# Patient Record
Sex: MALE | Race: WHITE | NOT HISPANIC OR LATINO | Employment: OTHER | URBAN - NONMETROPOLITAN AREA
[De-identification: names, ages, dates, MRNs, and addresses within clinical notes are randomized per-mention and may not be internally consistent; named-entity substitution may affect disease eponyms.]

---

## 2018-06-19 ENCOUNTER — OFFICE VISIT CONVERTED (OUTPATIENT)
Dept: FAMILY MEDICINE CLINIC | Age: 83
End: 2018-06-19
Attending: FAMILY MEDICINE

## 2018-10-19 ENCOUNTER — OFFICE VISIT CONVERTED (OUTPATIENT)
Dept: FAMILY MEDICINE CLINIC | Age: 83
End: 2018-10-19
Attending: FAMILY MEDICINE

## 2019-04-19 ENCOUNTER — HOSPITAL ENCOUNTER (OUTPATIENT)
Dept: OTHER | Facility: HOSPITAL | Age: 84
Discharge: HOME | End: 2019-04-19
Attending: FAMILY MEDICINE

## 2019-04-19 ENCOUNTER — OFFICE VISIT CONVERTED (OUTPATIENT)
Dept: FAMILY MEDICINE CLINIC | Age: 84
End: 2019-04-19
Attending: FAMILY MEDICINE

## 2019-04-19 LAB
ALBUMIN SERPL-MCNC: 4.2 G/DL (ref 3.5–5)
ALBUMIN/GLOB SERPL: 1.5 {RATIO} (ref 1.4–2.6)
ALP SERPL-CCNC: 67 U/L (ref 56–155)
ALT SERPL-CCNC: 17 U/L (ref 10–40)
ANION GAP SERPL CALC-SCNC: 16 MMOL/L (ref 8–19)
AST SERPL-CCNC: 23 U/L (ref 15–50)
BILIRUB SERPL-MCNC: 1.15 MG/DL (ref 0.2–1.3)
BUN SERPL-MCNC: 13 MG/DL (ref 5–25)
BUN/CREAT SERPL: 15 {RATIO} (ref 6–20)
CALCIUM SERPL-MCNC: 9.3 MG/DL (ref 8.7–10.4)
CHLORIDE SERPL-SCNC: 99 MMOL/L (ref 99–111)
CHOLEST SERPL-MCNC: 152 MG/DL (ref 107–200)
CHOLEST/HDLC SERPL: 1.7 {RATIO} (ref 3–6)
CONV CO2: 26 MMOL/L (ref 22–32)
CONV TOTAL PROTEIN: 7 G/DL (ref 6.3–8.2)
CREAT UR-MCNC: 0.84 MG/DL (ref 0.7–1.2)
EST. AVERAGE GLUCOSE BLD GHB EST-MCNC: 105 MG/DL
GFR SERPLBLD BASED ON 1.73 SQ M-ARVRAT: >60 ML/MIN/{1.73_M2}
GLOBULIN UR ELPH-MCNC: 2.8 G/DL (ref 2–3.5)
GLUCOSE SERPL-MCNC: 134 MG/DL (ref 70–99)
HBA1C MFR BLD: 5.3 % (ref 3.5–5.7)
HDLC SERPL-MCNC: 90 MG/DL (ref 40–60)
LDLC SERPL CALC-MCNC: 49 MG/DL (ref 70–100)
OSMOLALITY SERPL CALC.SUM OF ELEC: 284 MOSM/KG (ref 273–304)
POTASSIUM SERPL-SCNC: 4.5 MMOL/L (ref 3.5–5.3)
SODIUM SERPL-SCNC: 136 MMOL/L (ref 135–147)
TRIGL SERPL-MCNC: 65 MG/DL (ref 40–150)
VLDLC SERPL-MCNC: 13 MG/DL (ref 5–37)

## 2019-08-19 ENCOUNTER — OFFICE VISIT CONVERTED (OUTPATIENT)
Dept: FAMILY MEDICINE CLINIC | Age: 84
End: 2019-08-19
Attending: FAMILY MEDICINE

## 2019-08-19 ENCOUNTER — HOSPITAL ENCOUNTER (OUTPATIENT)
Dept: OTHER | Facility: HOSPITAL | Age: 84
Discharge: HOME | End: 2019-08-19
Attending: FAMILY MEDICINE

## 2019-08-19 LAB
ALBUMIN SERPL-MCNC: 4.5 G/DL (ref 3.5–5)
ALBUMIN/GLOB SERPL: 1.7 {RATIO} (ref 1.4–2.6)
ALP SERPL-CCNC: 62 U/L (ref 56–155)
ALT SERPL-CCNC: 27 U/L (ref 10–40)
ANION GAP SERPL CALC-SCNC: 18 MMOL/L (ref 8–19)
AST SERPL-CCNC: 31 U/L (ref 15–50)
BILIRUB SERPL-MCNC: 0.96 MG/DL (ref 0.2–1.3)
BUN SERPL-MCNC: 31 MG/DL (ref 5–25)
BUN/CREAT SERPL: 36 {RATIO} (ref 6–20)
CALCIUM SERPL-MCNC: 9.6 MG/DL (ref 8.7–10.4)
CHLORIDE SERPL-SCNC: 106 MMOL/L (ref 99–111)
CHOLEST SERPL-MCNC: 137 MG/DL (ref 107–200)
CHOLEST/HDLC SERPL: 1.9 {RATIO} (ref 3–6)
CONV CO2: 21 MMOL/L (ref 22–32)
CONV TOTAL PROTEIN: 7.2 G/DL (ref 6.3–8.2)
CREAT UR-MCNC: 0.86 MG/DL (ref 0.7–1.2)
ERYTHROCYTE [DISTWIDTH] IN BLOOD BY AUTOMATED COUNT: 13.4 % (ref 11.5–14.5)
EST. AVERAGE GLUCOSE BLD GHB EST-MCNC: 108 MG/DL
GFR SERPLBLD BASED ON 1.73 SQ M-ARVRAT: >60 ML/MIN/{1.73_M2}
GLOBULIN UR ELPH-MCNC: 2.7 G/DL (ref 2–3.5)
GLUCOSE SERPL-MCNC: 120 MG/DL (ref 70–99)
HBA1C MFR BLD: 15.2 G/DL (ref 14–18)
HBA1C MFR BLD: 5.4 % (ref 3.5–5.7)
HCT VFR BLD AUTO: 45.1 % (ref 42–52)
HDLC SERPL-MCNC: 71 MG/DL (ref 40–60)
LDLC SERPL CALC-MCNC: 56 MG/DL (ref 70–100)
MCH RBC QN AUTO: 32.8 PG (ref 27–31)
MCHC RBC AUTO-ENTMCNC: 33.7 G/DL (ref 33–37)
MCV RBC AUTO: 97.2 FL (ref 80–96)
OSMOLALITY SERPL CALC.SUM OF ELEC: 298 MOSM/KG (ref 273–304)
PLATELET # BLD AUTO: 230 10*3/UL (ref 130–400)
PMV BLD AUTO: 9.4 FL (ref 7.4–10.4)
POTASSIUM SERPL-SCNC: 4.9 MMOL/L (ref 3.5–5.3)
RBC # BLD AUTO: 4.64 10*6/UL (ref 4.7–6.1)
SODIUM SERPL-SCNC: 140 MMOL/L (ref 135–147)
TRIGL SERPL-MCNC: 51 MG/DL (ref 40–150)
TSH SERPL-ACNC: 1.95 M[IU]/L (ref 0.27–4.2)
VLDLC SERPL-MCNC: 10 MG/DL (ref 5–37)
WBC # BLD AUTO: 8.91 10*3/UL (ref 4.8–10.8)

## 2019-08-30 ENCOUNTER — CONVERSION ENCOUNTER (OUTPATIENT)
Dept: CARDIOLOGY | Facility: CLINIC | Age: 84
End: 2019-08-30
Attending: SPECIALIST

## 2019-10-21 ENCOUNTER — OFFICE VISIT CONVERTED (OUTPATIENT)
Dept: FAMILY MEDICINE CLINIC | Age: 84
End: 2019-10-21
Attending: FAMILY MEDICINE

## 2019-10-21 ENCOUNTER — HOSPITAL ENCOUNTER (OUTPATIENT)
Dept: OTHER | Facility: HOSPITAL | Age: 84
Discharge: HOME | End: 2019-10-21
Attending: FAMILY MEDICINE

## 2019-10-21 LAB
ALBUMIN SERPL-MCNC: 4.5 G/DL (ref 3.5–5)
ALBUMIN/GLOB SERPL: 1.5 {RATIO} (ref 1.4–2.6)
ALP SERPL-CCNC: 76 U/L (ref 56–155)
ALT SERPL-CCNC: 28 U/L (ref 10–40)
ANION GAP SERPL CALC-SCNC: 20 MMOL/L (ref 8–19)
AST SERPL-CCNC: 29 U/L (ref 15–50)
BASOPHILS # BLD MANUAL: 0.07 10*3/UL (ref 0–0.2)
BASOPHILS NFR BLD MANUAL: 0.9 % (ref 0–3)
BILIRUB SERPL-MCNC: 1.17 MG/DL (ref 0.2–1.3)
BUN SERPL-MCNC: 12 MG/DL (ref 5–25)
BUN/CREAT SERPL: 14 {RATIO} (ref 6–20)
CALCIUM SERPL-MCNC: 10.1 MG/DL (ref 8.7–10.4)
CHLORIDE SERPL-SCNC: 103 MMOL/L (ref 99–111)
CHOLEST SERPL-MCNC: 155 MG/DL (ref 107–200)
CHOLEST/HDLC SERPL: 2.2 {RATIO} (ref 3–6)
CONV CO2: 22 MMOL/L (ref 22–32)
CONV TOTAL PROTEIN: 7.5 G/DL (ref 6.3–8.2)
CREAT UR-MCNC: 0.86 MG/DL (ref 0.7–1.2)
DEPRECATED RDW RBC AUTO: 46.1 FL
EOSINOPHIL # BLD MANUAL: 0.41 10*3/UL (ref 0–0.7)
EOSINOPHIL NFR BLD MANUAL: 5.2 % (ref 0–7)
ERYTHROCYTE [DISTWIDTH] IN BLOOD BY AUTOMATED COUNT: 12.7 % (ref 11.5–14.5)
EST. AVERAGE GLUCOSE BLD GHB EST-MCNC: 111 MG/DL
GFR SERPLBLD BASED ON 1.73 SQ M-ARVRAT: >60 ML/MIN/{1.73_M2}
GLOBULIN UR ELPH-MCNC: 3 G/DL (ref 2–3.5)
GLUCOSE SERPL-MCNC: 122 MG/DL (ref 70–99)
GRANS (ABSOLUTE): 5.28 10*3/UL (ref 2–8)
GRANS: 66.5 % (ref 30–85)
HBA1C MFR BLD: 17.1 G/DL (ref 14–18)
HBA1C MFR BLD: 5.5 % (ref 3.5–5.7)
HCT VFR BLD AUTO: 51.8 % (ref 42–52)
HDLC SERPL-MCNC: 72 MG/DL (ref 40–60)
IMM GRANULOCYTES # BLD: 0.01 10*3/UL (ref 0–0.54)
IMM GRANULOCYTES NFR BLD: 0.1 % (ref 0–0.43)
LDLC SERPL CALC-MCNC: 67 MG/DL (ref 70–100)
LYMPHOCYTES # BLD MANUAL: 1.38 10*3/UL (ref 1–5)
LYMPHOCYTES NFR BLD MANUAL: 9.9 % (ref 3–10)
MCH RBC QN AUTO: 32 PG (ref 27–31)
MCHC RBC AUTO-ENTMCNC: 33 G/DL (ref 33–37)
MCV RBC AUTO: 96.8 FL (ref 80–96)
MONOCYTES # BLD AUTO: 0.79 10*3/UL (ref 0.2–1.2)
OSMOLALITY SERPL CALC.SUM OF ELEC: 291 MOSM/KG (ref 273–304)
PLATELET # BLD AUTO: 218 10*3/UL (ref 130–400)
PMV BLD AUTO: 9.1 FL (ref 7.4–10.4)
POTASSIUM SERPL-SCNC: 4.8 MMOL/L (ref 3.5–5.3)
PSA SERPL-MCNC: 1.16 NG/ML (ref 0–4)
RBC # BLD AUTO: 5.35 10*6/UL (ref 4.7–6.1)
SODIUM SERPL-SCNC: 140 MMOL/L (ref 135–147)
TRIGL SERPL-MCNC: 78 MG/DL (ref 40–150)
TSH SERPL-ACNC: 2.02 M[IU]/L (ref 0.27–4.2)
VARIANT LYMPHS NFR BLD MANUAL: 17.4 % (ref 20–45)
VLDLC SERPL-MCNC: 16 MG/DL (ref 5–37)
WBC # BLD AUTO: 7.94 10*3/UL (ref 4.8–10.8)

## 2019-12-12 ENCOUNTER — OUTSIDE FACILITY SERVICE (OUTPATIENT)
Dept: ORTHOPEDIC SURGERY | Facility: CLINIC | Age: 84
End: 2019-12-12

## 2019-12-12 PROCEDURE — 99222 1ST HOSP IP/OBS MODERATE 55: CPT | Performed by: ORTHOPAEDIC SURGERY

## 2020-03-02 ENCOUNTER — HOSPITAL ENCOUNTER (OUTPATIENT)
Dept: OTHER | Facility: HOSPITAL | Age: 85
Discharge: HOME | End: 2020-03-02
Attending: FAMILY MEDICINE

## 2020-03-02 ENCOUNTER — OFFICE VISIT CONVERTED (OUTPATIENT)
Dept: FAMILY MEDICINE CLINIC | Age: 85
End: 2020-03-02
Attending: FAMILY MEDICINE

## 2020-03-02 LAB
ALBUMIN SERPL-MCNC: 4.1 G/DL (ref 3.5–5)
ALBUMIN/GLOB SERPL: 1.4 {RATIO} (ref 1.4–2.6)
ALP SERPL-CCNC: 197 U/L (ref 56–155)
ALT SERPL-CCNC: 20 U/L (ref 10–40)
ANION GAP SERPL CALC-SCNC: 18 MMOL/L (ref 8–19)
AST SERPL-CCNC: 29 U/L (ref 15–50)
BASOPHILS # BLD AUTO: 0.11 10*3/UL (ref 0–0.2)
BASOPHILS NFR BLD AUTO: 1.1 % (ref 0–3)
BILIRUB SERPL-MCNC: 1.02 MG/DL (ref 0.2–1.3)
BUN SERPL-MCNC: 13 MG/DL (ref 5–25)
BUN/CREAT SERPL: 15 {RATIO} (ref 6–20)
CALCIUM SERPL-MCNC: 9.4 MG/DL (ref 8.7–10.4)
CHLORIDE SERPL-SCNC: 99 MMOL/L (ref 99–111)
CHOLEST SERPL-MCNC: 178 MG/DL (ref 107–200)
CHOLEST/HDLC SERPL: 2 {RATIO} (ref 3–6)
CONV ABS IMM GRAN: 0.06 10*3/UL (ref 0–0.2)
CONV CO2: 25 MMOL/L (ref 22–32)
CONV IMMATURE GRAN: 0.6 % (ref 0–1.8)
CONV TOTAL PROTEIN: 7 G/DL (ref 6.3–8.2)
CREAT UR-MCNC: 0.86 MG/DL (ref 0.7–1.2)
DEPRECATED RDW RBC AUTO: 55 FL (ref 35.1–43.9)
EOSINOPHIL # BLD AUTO: 0.21 10*3/UL (ref 0–0.7)
EOSINOPHIL # BLD AUTO: 2.2 % (ref 0–7)
ERYTHROCYTE [DISTWIDTH] IN BLOOD BY AUTOMATED COUNT: 16.1 % (ref 11.6–14.4)
EST. AVERAGE GLUCOSE BLD GHB EST-MCNC: 105 MG/DL
GFR SERPLBLD BASED ON 1.73 SQ M-ARVRAT: >60 ML/MIN/{1.73_M2}
GLOBULIN UR ELPH-MCNC: 2.9 G/DL (ref 2–3.5)
GLUCOSE SERPL-MCNC: 131 MG/DL (ref 70–99)
HBA1C MFR BLD: 5.3 % (ref 3.5–5.7)
HCT VFR BLD AUTO: 49.5 % (ref 42–52)
HDLC SERPL-MCNC: 89 MG/DL (ref 40–60)
HGB BLD-MCNC: 15.7 G/DL (ref 14–18)
LDLC SERPL CALC-MCNC: 75 MG/DL (ref 70–100)
LYMPHOCYTES # BLD AUTO: 1.58 10*3/UL (ref 1–5)
LYMPHOCYTES NFR BLD AUTO: 16.5 % (ref 20–45)
MCH RBC QN AUTO: 29.3 PG (ref 27–31)
MCHC RBC AUTO-ENTMCNC: 31.7 G/DL (ref 33–37)
MCV RBC AUTO: 92.4 FL (ref 80–96)
MONOCYTES # BLD AUTO: 0.71 10*3/UL (ref 0.2–1.2)
MONOCYTES NFR BLD AUTO: 7.4 % (ref 3–10)
NEUTROPHILS # BLD AUTO: 6.91 10*3/UL (ref 2–8)
NEUTROPHILS NFR BLD AUTO: 72.2 % (ref 30–85)
NRBC CBCN: 0 % (ref 0–0.7)
OSMOLALITY SERPL CALC.SUM OF ELEC: 286 MOSM/KG (ref 273–304)
PLATELET # BLD AUTO: 243 10*3/UL (ref 130–400)
PMV BLD AUTO: 9.7 FL (ref 9.4–12.4)
POTASSIUM SERPL-SCNC: 4.7 MMOL/L (ref 3.5–5.3)
RBC # BLD AUTO: 5.36 10*6/UL (ref 4.7–6.1)
SODIUM SERPL-SCNC: 137 MMOL/L (ref 135–147)
TRIGL SERPL-MCNC: 72 MG/DL (ref 40–150)
TSH SERPL-ACNC: 1.73 M[IU]/L (ref 0.27–4.2)
VLDLC SERPL-MCNC: 14 MG/DL (ref 5–37)
WBC # BLD AUTO: 9.58 10*3/UL (ref 4.8–10.8)

## 2020-06-05 ENCOUNTER — HOSPITAL ENCOUNTER (OUTPATIENT)
Dept: OTHER | Facility: HOSPITAL | Age: 85
Discharge: HOME | End: 2020-06-05
Attending: FAMILY MEDICINE

## 2020-06-05 ENCOUNTER — OFFICE VISIT CONVERTED (OUTPATIENT)
Dept: FAMILY MEDICINE CLINIC | Age: 85
End: 2020-06-05
Attending: FAMILY MEDICINE

## 2020-06-05 LAB
ALBUMIN SERPL-MCNC: 4.4 G/DL (ref 3.5–5)
ALBUMIN/GLOB SERPL: 1.7 {RATIO} (ref 1.4–2.6)
ALP SERPL-CCNC: 103 U/L (ref 56–155)
ALT SERPL-CCNC: 15 U/L (ref 10–40)
ANION GAP SERPL CALC-SCNC: 15 MMOL/L (ref 8–19)
AST SERPL-CCNC: 21 U/L (ref 15–50)
BILIRUB SERPL-MCNC: 0.89 MG/DL (ref 0.2–1.3)
BUN SERPL-MCNC: 15 MG/DL (ref 5–25)
BUN/CREAT SERPL: 17 {RATIO} (ref 6–20)
CALCIUM SERPL-MCNC: 9.8 MG/DL (ref 8.7–10.4)
CHLORIDE SERPL-SCNC: 102 MMOL/L (ref 99–111)
CHOLEST SERPL-MCNC: 177 MG/DL (ref 107–200)
CHOLEST/HDLC SERPL: 2 {RATIO} (ref 3–6)
CONV CO2: 24 MMOL/L (ref 22–32)
CONV TOTAL PROTEIN: 7 G/DL (ref 6.3–8.2)
CREAT UR-MCNC: 0.89 MG/DL (ref 0.7–1.2)
GFR SERPLBLD BASED ON 1.73 SQ M-ARVRAT: >60 ML/MIN/{1.73_M2}
GLOBULIN UR ELPH-MCNC: 2.6 G/DL (ref 2–3.5)
GLUCOSE SERPL-MCNC: 118 MG/DL (ref 70–99)
HDLC SERPL-MCNC: 90 MG/DL (ref 40–60)
LDLC SERPL CALC-MCNC: 75 MG/DL (ref 70–100)
OSMOLALITY SERPL CALC.SUM OF ELEC: 284 MOSM/KG (ref 273–304)
POTASSIUM SERPL-SCNC: 4.9 MMOL/L (ref 3.5–5.3)
SODIUM SERPL-SCNC: 136 MMOL/L (ref 135–147)
TRIGL SERPL-MCNC: 62 MG/DL (ref 40–150)
VLDLC SERPL-MCNC: 12 MG/DL (ref 5–37)

## 2020-09-14 ENCOUNTER — OFFICE VISIT CONVERTED (OUTPATIENT)
Dept: FAMILY MEDICINE CLINIC | Age: 85
End: 2020-09-14
Attending: FAMILY MEDICINE

## 2020-09-14 ENCOUNTER — HOSPITAL ENCOUNTER (OUTPATIENT)
Dept: OTHER | Facility: HOSPITAL | Age: 85
Discharge: HOME | End: 2020-09-14
Attending: FAMILY MEDICINE

## 2020-09-14 LAB
ALBUMIN SERPL-MCNC: 4.4 G/DL (ref 3.5–5)
ALBUMIN/GLOB SERPL: 1.7 {RATIO} (ref 1.4–2.6)
ALP SERPL-CCNC: 82 U/L (ref 56–155)
ALT SERPL-CCNC: 26 U/L (ref 10–40)
ANION GAP SERPL CALC-SCNC: 18 MMOL/L (ref 8–19)
AST SERPL-CCNC: 35 U/L (ref 15–50)
BILIRUB SERPL-MCNC: 1.05 MG/DL (ref 0.2–1.3)
BUN SERPL-MCNC: 13 MG/DL (ref 5–25)
BUN/CREAT SERPL: 15 {RATIO} (ref 6–20)
CALCIUM SERPL-MCNC: 9.8 MG/DL (ref 8.7–10.4)
CHLORIDE SERPL-SCNC: 99 MMOL/L (ref 99–111)
CHOLEST SERPL-MCNC: 180 MG/DL (ref 107–200)
CHOLEST/HDLC SERPL: 1.8 {RATIO} (ref 3–6)
CONV CO2: 26 MMOL/L (ref 22–32)
CONV TOTAL PROTEIN: 7 G/DL (ref 6.3–8.2)
CREAT UR-MCNC: 0.89 MG/DL (ref 0.7–1.2)
EST. AVERAGE GLUCOSE BLD GHB EST-MCNC: 100 MG/DL
GFR SERPLBLD BASED ON 1.73 SQ M-ARVRAT: >60 ML/MIN/{1.73_M2}
GLOBULIN UR ELPH-MCNC: 2.6 G/DL (ref 2–3.5)
GLUCOSE SERPL-MCNC: 118 MG/DL (ref 70–99)
HBA1C MFR BLD: 5.1 % (ref 3.5–5.7)
HDLC SERPL-MCNC: 99 MG/DL (ref 40–60)
LDLC SERPL CALC-MCNC: 73 MG/DL (ref 70–100)
OSMOLALITY SERPL CALC.SUM OF ELEC: 285 MOSM/KG (ref 273–304)
POTASSIUM SERPL-SCNC: 5.5 MMOL/L (ref 3.5–5.3)
SODIUM SERPL-SCNC: 137 MMOL/L (ref 135–147)
TRIGL SERPL-MCNC: 41 MG/DL (ref 40–150)
VLDLC SERPL-MCNC: 8 MG/DL (ref 5–37)

## 2020-10-14 ENCOUNTER — HOSPITAL ENCOUNTER (OUTPATIENT)
Dept: OTHER | Facility: HOSPITAL | Age: 85
Discharge: HOME | End: 2020-10-14
Attending: FAMILY MEDICINE

## 2020-10-14 LAB
ANION GAP SERPL CALC-SCNC: 18 MMOL/L (ref 8–19)
BUN SERPL-MCNC: 21 MG/DL (ref 5–25)
BUN/CREAT SERPL: 22 {RATIO} (ref 6–20)
CALCIUM SERPL-MCNC: 9.7 MG/DL (ref 8.7–10.4)
CHLORIDE SERPL-SCNC: 101 MMOL/L (ref 99–111)
CONV CO2: 24 MMOL/L (ref 22–32)
CREAT UR-MCNC: 0.97 MG/DL (ref 0.7–1.2)
GFR SERPLBLD BASED ON 1.73 SQ M-ARVRAT: >60 ML/MIN/{1.73_M2}
GLUCOSE SERPL-MCNC: 130 MG/DL (ref 70–99)
OSMOLALITY SERPL CALC.SUM OF ELEC: 291 MOSM/KG (ref 273–304)
POTASSIUM SERPL-SCNC: 5.4 MMOL/L (ref 3.5–5.3)
SODIUM SERPL-SCNC: 138 MMOL/L (ref 135–147)

## 2021-01-01 ENCOUNTER — TELEPHONE (OUTPATIENT)
Dept: FAMILY MEDICINE CLINIC | Age: 86
End: 2021-01-01

## 2021-01-01 ENCOUNTER — OFFICE VISIT (OUTPATIENT)
Dept: FAMILY MEDICINE CLINIC | Age: 86
End: 2021-01-01

## 2021-01-01 ENCOUNTER — LAB (OUTPATIENT)
Dept: LAB | Facility: HOSPITAL | Age: 86
End: 2021-01-01

## 2021-01-01 VITALS
HEIGHT: 68 IN | TEMPERATURE: 97.7 F | BODY MASS INDEX: 24.77 KG/M2 | DIASTOLIC BLOOD PRESSURE: 74 MMHG | SYSTOLIC BLOOD PRESSURE: 132 MMHG | WEIGHT: 163.4 LBS | HEART RATE: 67 BPM

## 2021-01-01 DIAGNOSIS — R73.03 PRE-DIABETES: ICD-10-CM

## 2021-01-01 DIAGNOSIS — I10 ESSENTIAL HYPERTENSION, BENIGN: ICD-10-CM

## 2021-01-01 DIAGNOSIS — G89.29 CHRONIC PAIN OF RIGHT KNEE: ICD-10-CM

## 2021-01-01 DIAGNOSIS — T84.012D FAILED TOTAL RIGHT KNEE REPLACEMENT, SUBSEQUENT ENCOUNTER: ICD-10-CM

## 2021-01-01 DIAGNOSIS — E78.2 MIXED HYPERLIPIDEMIA: ICD-10-CM

## 2021-01-01 DIAGNOSIS — M15.0 PRIMARY GENERALIZED HYPERTROPHIC OSTEOARTHROSIS: ICD-10-CM

## 2021-01-01 DIAGNOSIS — M25.561 CHRONIC PAIN OF RIGHT KNEE: ICD-10-CM

## 2021-01-01 DIAGNOSIS — Z79.899 LONG-TERM USE OF HIGH-RISK MEDICATION: ICD-10-CM

## 2021-01-01 DIAGNOSIS — I10 ESSENTIAL HYPERTENSION, BENIGN: Primary | ICD-10-CM

## 2021-01-01 DIAGNOSIS — M15.0 PRIMARY GENERALIZED HYPERTROPHIC OSTEOARTHROSIS: Primary | ICD-10-CM

## 2021-01-01 DIAGNOSIS — I10 ESSENTIAL HYPERTENSION: ICD-10-CM

## 2021-01-01 DIAGNOSIS — H90.0 CONDUCTIVE HEARING LOSS, BILATERAL: ICD-10-CM

## 2021-01-01 LAB
ALBUMIN SERPL-MCNC: 4.5 G/DL (ref 3.5–5.2)
ALBUMIN/GLOB SERPL: 1.9 G/DL
ALP SERPL-CCNC: 69 U/L (ref 39–117)
ALT SERPL W P-5'-P-CCNC: 25 U/L (ref 1–41)
AMPHET+METHAMPHET UR QL: NEGATIVE
AMPHETAMINES UR QL: NEGATIVE
ANION GAP SERPL CALCULATED.3IONS-SCNC: 9.7 MMOL/L (ref 5–15)
AST SERPL-CCNC: 33 U/L (ref 1–40)
BARBITURATES UR QL SCN: NEGATIVE
BENZODIAZ UR QL SCN: NEGATIVE
BILIRUB SERPL-MCNC: 1 MG/DL (ref 0–1.2)
BUN SERPL-MCNC: 22 MG/DL (ref 8–23)
BUN/CREAT SERPL: 22.9 (ref 7–25)
BUPRENORPHINE SERPL-MCNC: NEGATIVE NG/ML
CALCIUM SPEC-SCNC: 9.6 MG/DL (ref 8.6–10.5)
CANNABINOIDS SERPL QL: NEGATIVE
CHLORIDE SERPL-SCNC: 101 MMOL/L (ref 98–107)
CHOLEST SERPL-MCNC: 162 MG/DL (ref 0–200)
CO2 SERPL-SCNC: 25.3 MMOL/L (ref 22–29)
COCAINE UR QL: NEGATIVE
CREAT SERPL-MCNC: 0.96 MG/DL (ref 0.76–1.27)
EXPIRATION DATE: NORMAL
GFR SERPL CREATININE-BSD FRML MDRD: 74 ML/MIN/1.73
GLOBULIN UR ELPH-MCNC: 2.4 GM/DL
GLUCOSE SERPL-MCNC: 105 MG/DL (ref 65–99)
HBA1C MFR BLD: 5.3 % (ref 4.8–5.6)
HDLC SERPL-MCNC: 77 MG/DL (ref 40–60)
LDLC SERPL CALC-MCNC: 70 MG/DL (ref 0–100)
LDLC/HDLC SERPL: 0.9 {RATIO}
Lab: NORMAL
MDMA UR QL SCN: NEGATIVE
METHADONE UR QL SCN: NEGATIVE
OPIATES UR QL: NEGATIVE
OXYCODONE UR QL SCN: NEGATIVE
PCP UR QL SCN: NEGATIVE
POTASSIUM SERPL-SCNC: 4.9 MMOL/L (ref 3.5–5.2)
PROT SERPL-MCNC: 6.9 G/DL (ref 6–8.5)
SODIUM SERPL-SCNC: 136 MMOL/L (ref 136–145)
TRIGL SERPL-MCNC: 78 MG/DL (ref 0–150)
VLDLC SERPL-MCNC: 15 MG/DL (ref 5–40)

## 2021-01-01 PROCEDURE — 99214 OFFICE O/P EST MOD 30 MIN: CPT | Performed by: FAMILY MEDICINE

## 2021-01-01 PROCEDURE — 80053 COMPREHEN METABOLIC PANEL: CPT

## 2021-01-01 PROCEDURE — 36415 COLL VENOUS BLD VENIPUNCTURE: CPT

## 2021-01-01 PROCEDURE — 80305 DRUG TEST PRSMV DIR OPT OBS: CPT | Performed by: FAMILY MEDICINE

## 2021-01-01 PROCEDURE — 83036 HEMOGLOBIN GLYCOSYLATED A1C: CPT

## 2021-01-01 PROCEDURE — 80061 LIPID PANEL: CPT | Performed by: FAMILY MEDICINE

## 2021-01-01 RX ORDER — VITAMIN E 268 MG
CAPSULE ORAL DAILY
COMMUNITY
End: 2022-01-01

## 2021-01-01 RX ORDER — TRAMADOL HYDROCHLORIDE 50 MG/1
50 TABLET ORAL 3 TIMES DAILY
Qty: 90 TABLET | Refills: 1 | Status: SHIPPED | OUTPATIENT
Start: 2021-01-01 | End: 2022-01-01

## 2021-01-01 RX ORDER — LISINOPRIL 30 MG/1
TABLET ORAL
Qty: 30 TABLET | Refills: 2 | Status: SHIPPED | OUTPATIENT
Start: 2021-01-01 | End: 2022-01-01

## 2021-01-01 RX ORDER — TRAMADOL HYDROCHLORIDE 50 MG/1
50 TABLET ORAL 3 TIMES DAILY
Qty: 90 TABLET | Refills: 1 | Status: CANCELLED | OUTPATIENT
Start: 2021-01-01

## 2021-01-01 RX ORDER — ALLOPURINOL 300 MG/1
300 TABLET ORAL DAILY
COMMUNITY
End: 2021-01-01

## 2021-01-01 RX ORDER — AMLODIPINE BESYLATE 10 MG/1
TABLET ORAL
Qty: 90 TABLET | Refills: 0 | Status: SHIPPED | OUTPATIENT
Start: 2021-01-01 | End: 2022-01-01

## 2021-01-01 RX ORDER — ACETAMINOPHEN 500 MG
500 TABLET ORAL EVERY 6 HOURS PRN
COMMUNITY

## 2021-01-01 RX ORDER — ATORVASTATIN CALCIUM 10 MG/1
10 TABLET, FILM COATED ORAL NIGHTLY
Qty: 90 TABLET | Refills: 0 | Status: SHIPPED | OUTPATIENT
Start: 2021-01-01 | End: 2022-01-01

## 2021-01-01 RX ORDER — LISINOPRIL 30 MG/1
TABLET ORAL
Qty: 30 TABLET | Refills: 2 | Status: SHIPPED | OUTPATIENT
Start: 2021-01-01 | End: 2021-01-01

## 2021-01-11 ENCOUNTER — HOSPITAL ENCOUNTER (OUTPATIENT)
Dept: OTHER | Facility: HOSPITAL | Age: 86
Discharge: HOME | End: 2021-01-11
Attending: FAMILY MEDICINE

## 2021-01-11 ENCOUNTER — OFFICE VISIT CONVERTED (OUTPATIENT)
Dept: FAMILY MEDICINE CLINIC | Age: 86
End: 2021-01-11
Attending: FAMILY MEDICINE

## 2021-01-11 LAB
ALBUMIN SERPL-MCNC: 4.3 G/DL (ref 3.5–5)
ALBUMIN/GLOB SERPL: 1.7 {RATIO} (ref 1.4–2.6)
ALP SERPL-CCNC: 68 U/L (ref 56–155)
ALT SERPL-CCNC: 17 U/L (ref 10–40)
ANION GAP SERPL CALC-SCNC: 13 MMOL/L (ref 8–19)
AST SERPL-CCNC: 24 U/L (ref 15–50)
BILIRUB SERPL-MCNC: 0.78 MG/DL (ref 0.2–1.3)
BUN SERPL-MCNC: 15 MG/DL (ref 5–25)
BUN/CREAT SERPL: 18 {RATIO} (ref 6–20)
CALCIUM SERPL-MCNC: 9.6 MG/DL (ref 8.7–10.4)
CHLORIDE SERPL-SCNC: 101 MMOL/L (ref 99–111)
CHOLEST SERPL-MCNC: 161 MG/DL (ref 107–200)
CHOLEST/HDLC SERPL: 1.5 {RATIO} (ref 3–6)
CONV CO2: 26 MMOL/L (ref 22–32)
CONV TOTAL PROTEIN: 6.8 G/DL (ref 6.3–8.2)
CREAT UR-MCNC: 0.84 MG/DL (ref 0.7–1.2)
GFR SERPLBLD BASED ON 1.73 SQ M-ARVRAT: >60 ML/MIN/{1.73_M2}
GLOBULIN UR ELPH-MCNC: 2.5 G/DL (ref 2–3.5)
GLUCOSE SERPL-MCNC: 113 MG/DL (ref 70–99)
HDLC SERPL-MCNC: 107 MG/DL (ref 40–60)
LDLC SERPL CALC-MCNC: 48 MG/DL (ref 70–100)
OSMOLALITY SERPL CALC.SUM OF ELEC: 282 MOSM/KG (ref 273–304)
POTASSIUM SERPL-SCNC: 4.6 MMOL/L (ref 3.5–5.3)
SODIUM SERPL-SCNC: 135 MMOL/L (ref 135–147)
TRIGL SERPL-MCNC: 29 MG/DL (ref 40–150)
VLDLC SERPL-MCNC: 6 MG/DL (ref 5–37)

## 2021-05-18 NOTE — PROGRESS NOTES
Billy Larry 1934     Office/Outpatient Visit    Visit Date: Mon, Oct 21, 2019 09:31 am    Provider: Ramon Herrera MD (Assistant: Chloe Penn MA)    Location: [facility]        Electronically signed by Ramon Herrera MD on  10/21/2019 11:44:34 AM                             SUBJECTIVE:        CC:     Ced is a 85 year old White male.  This is a follow-up visit.  bp check PT STATES HE IS ONLY TAKING LISINOPRIL, ATORVASTATIN, AMLODIPINEE, AND ASPIRIN         HPI:         Ced presents with mixed hyperlipidemia.  Compliance with treatment has been good.  He specifically denies associated symptoms, including muscle pain and weakness.  Reviewed the lab work from August with him.     Regards to his blood pressure he is been doing quite a bit better since we simplified his medication regimen.  He is no longer taking Bystolic he provided me with a list of the pressures over the past few weeks (scanned).  It is also notable that his pulse was always in the 60s or above.  He has not been as fatigued.        We did stop his metformin, allopurinol, as well as the Bystolic.  His blood sugars have been doing okay Off of the metformin.  He has not had any flareup of gout.Continues to struggle with the arthritis issues especially in his right knee.     One thing that stands out with him today is his continued weight loss.  He thinks that much of it may be related to the fact that he is been somewhat upset about his vision.  Says that he is been to the eye doctor's office several times in the past few months and they are having trouble finding out exactly what is going wrong with his vision.  We do not have copies of those office notes.     ROS:     CONSTITUTIONAL:  Negative for chills, fatigue and fever.      CARDIOVASCULAR:  Negative for chest pain, orthopnea, paroxysmal nocturnal dyspnea and pedal edema.      RESPIRATORY:  Negative for dyspnea and cough.      GASTROINTESTINAL:  Negative for abdominal  pain, heartburn, constipation, diarrhea, and stool changes.      GENITOURINARY:  Negative for dysuria and polyuria.      PSYCHIATRIC:  Negative for anxiety and depression.          PMH/FMH/SH:     Last Reviewed on 10/21/2019 10:01 AM by Ramon Herrera    Past Medical History:         Positive for    Peptic Ulcer Disease: dx'd in 2011; dx by EGD; significant GI Bleeding; ;         CURRENT MEDICAL PROVIDERS:    Orthopedist: dr. escalante    vascular, dr. stephenson         PREVENTIVE HEALTH MAINTENANCE             COLORECTAL CANCER SCREENING:; 2011         Surgical History:         Joint Replacement: knee,right; ,;      Carotid Endarterectomy: bilateral; , 2011;     Removal Giant Nevus- left chest- 3/11;     Procedures:    left eye cataract 2018 FRANCISCO TRIPLETT         Family History:     Father:  at age 40; Cause of death was GSW     Mother: Myocardial Infarction ( 79 yo )     Sister(s): Healthy         Social History:     Occupation: Retired (Prior occupation: ,)         Tobacco/Alcohol/Supplements:     Last Reviewed on 10/21/2019 09:32 AM by Chloe Penn    Tobacco: He has never smoked.          Alcohol: Frequency: Daily When he drinks, the average quantity of alcohol is 2-3 drinks.          Substance Abuse History:     Last Reviewed on 2016 02:56 PM by Alycia Hou        Mental Health History:     Last Reviewed on 2016 02:56 PM by Alycia Hou        Communicable Diseases (eg STDs):     Last Reviewed on 2016 02:56 PM by Alycia Hou            Allergies:     Last Reviewed on 10/21/2019 09:32 AM by Chloe Penn    Meloxicam:        Current Medications:     Last Reviewed on 10/21/2019 09:32 AM by Chloe Penn    Atorvastatin Calcium 20mg Tablet Take 1 tablet(s) by mouth daily     Lisinopril 40mg Tablet Take 1 tablet(s) by mouth daily     Allopurinol 300mg Tablet Take 1 tablet(s) by mouth daily     Metformin HCl 500mg Tablets, Extended Release Take 1  tablet(s) by mouth daily     Amlodipine  10mg Tablet Take 1 tablet(s) by mouth daily     Bystolic 10mg Tablet Take 1 tablet(s) by mouth daily     Aspirin (ASA) 81mg Tablets, Enteric Coated 1 tab daily     Vitamin C 500mg Chewable Tablet Chew 1 tablet(s) by mouth daily     Vitamin D3 1,000IU Capsules take 1 capsule daily     Lumigan 0.01% Ophthalmic Solution Instill 1 drop(s) to affected eye(s) each evening     Timolol Maleate 0.5% Ophthalmic Solution 1 drop both eye daily         OBJECTIVE:        Vitals:         Current: 10/21/2019 9:52:43 AM    Ht:  5 ft, 8.25 in;  Wt: 173.4 lbs;  BMI: 26.2    T: 97.5 F (oral);  BP: 180/96 mm Hg (left arm, sitting);  P: 66 bpm (left arm (BP Cuff), sitting);  sCr: 0.86 mg/dL;  GFR: 59.63        Repeat:     9:53:19 AM     BP:   152/91mm Hg (left arm, sitting, P-)         Exams:     PHYSICAL EXAM:     GENERAL:  well developed and nourished; appropriately groomed; in no apparent distress; He was initially sitting in the room with dark sunglasses on.  Lower the intensity of the light in the room he was able to remove the sunglasses.     EYES: Nonicteric and with unremarkable lids, iris and pupils;     E/N/T:  normal EACs, TMs, nasal/oral mucosa, teeth, gingiva, and oropharynx;     NECK: thyroid exam reveals no discrete nodules;  carotid exam reveals no bruits;     RESPIRATORY: normal respiratory rate and pattern with no distress; normal breath sounds with no rales, rhonchi, wheezes or rubs;     CARDIOVASCULAR: normal rate; rhythm is regular;  a systolic murmur is noted: it is grade 2/6;     LYMPHATIC: no enlargement of cervical or facial nodes;     MUSCULOSKELETAL: right knee does not extend fully, he walks with cane.; The right knee joint is warm to touch     NEUROLOGIC: No lateralizing deficit.;     PSYCHIATRIC:  appropriate affect and demeanor; normal speech pattern; grossly normal memory;         Procedures:     Vaccination against other viral diseases, Influenza     1. 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              ASSESSMENT           272.2   E78.2  Mixed hyperlipidemia              DDx:     401.1   I10  Hypertension              DDx:     790.29   R73.02  Prediabetes              DDx:     715.09   M15.0  Generalized osteoarthritis, multiple sites              DDx:     715.16   M17.11  Osteoarthritis of knee              DDx:     300.3   F42.8  Obsessive-compulsive disorder              DDx:     274.9   M10.09  Gout              DDx:     427.89   R00.1  Bradycardia              DDx:     783.21   R63.4  Unexplained weight loss              DDx:     V76.44   Z12.5  Screening for prostate cancer              DDx:     V76.51   Z12.11  Screening for cancer of colon              DDx:     V04.81   Z23  Vaccination against other viral diseases, Influenza              DDx:         ORDERS:         Meds Prescribed:       Refill of: Atorvastatin Calcium 20mg Tablet Take 1 tablet(s) by mouth daily  #90 (Ninety) tablet(s) Refills: 0         Lab Orders:       [id number]  Cologuard colorectal screening nakul 10 DNA markers  (Send-Out)         *  PRSAS Medicare screening PSA  (Send-Out)         [id number]  DIAB - [facility] LIPID,CMP, A1C: 04189, 08447, 30711  (Send-Out)         97710  BDCBC - [facility] CBC with 3 part diff  (Send-Out)         73880  TSH - [facility] TSH  (Send-Out)         APPTO  Appointment need  (In-House)           Procedures Ordered:       [id number]  Fluzone High Dose  (In-House)           Other Orders:         Administration of influenza virus vaccine (x1)                 PLAN: He is now off bystolic, metformin, allopurinol.  We are still waiting to  get him approved for chronic care management.          Mixed hyperlipidemia Will repeat his lab and predicate any medication changes based on those results         FOLLOW-UP: Schedule a follow-up visit in 3 months.:.            Prescriptions:       Refill of: Atorvastatin Calcium 20mg Tablet Take 1 tablet(s) by mouth daily  #90 (Ninety) tablet(s) Refills: 0           Orders:       APPTO  Appointment need  (In-House)            Hypertension Continue on his current regimen for now          Prediabetes We will see what his hemoglobin A1c looks like now that he is been off the metformin for a little while.  I suspect it will still be good since his weight is actually gone down.     LABORATORY:  Labs ordered to be performed today include Diabetes Panel 1; CMP, Lipid, A1C.            Orders:       [id number]  DIAB - [facility] LIPID,CMP, A1C: 35077, 24614, 07471  (Send-Out)            Generalized osteoarthritis, multiple sites He continues to toil with his current functional limitations related to his arthritis.  He still holding out in regards to having another surgery.          Osteoarthritis of knee Continue with his current medications and use of cane for ambulation assistance.          Obsessive-compulsive disorder At present he is not interested in additional medication          Gout He can stay off the allopurinol for the time being          Bradycardia The bradycardia seems to resolve since we get him off of the Bystolic          Unexplained weight loss Not sure if his weight loss can be totally accounted for by his mood and vision concerns.  Go ahead and repeat some of his lab work and also check a PSA level     LABORATORY:  Labs ordered to be performed today include CBC and TSH.            Orders:       [id number]  BDCBC - [facility] CBC with 3 part diff  (Send-Out)         48932  TSH - [facility] TSH  (Send-Out)            Screening for prostate cancer We did discuss the risk of harm related to PSA testing especially at his age      LABORATORY:  Labs ordered to be performed today include PSA.            Orders:       *  [employer] Medicare screening PSA  (Send-Out)            Screening for cancer of colon     LABORATORY:  Labs ordered to be performed today include Cologuard Testing.            Orders:       [id number]  Cologuard colorectal screening nakul 10 DNA markers  (Send-Out)            Vaccination against other viral diseases, Influenza         IMMUNIZATIONS given today: Influenza HIGH Dose.            Orders:       [id number]  Fluzone High Dose  (In-House)                     Administration of influenza virus vaccine (x1)             Patient Recommendations:    Dragon transcription disclaimer:        Much of this encounter note is an electronic transcription/translation of spoken language to printed text.  The electronic translation of spoken language may permit erroneous, or at times, nonsensical words or phrases to be inadvertently transcribed.  Although I have reviewed the note for such errors, some may still exist.         For  Mixed hyperlipidemia:     Schedule a follow-up visit in 3 months.                APPOINTMENT INFORMATION:        Monday Tuesday Wednesday Thursday Friday Saturday Sunday            Time:___________________AM  PM   Date:_____________________             CHARGE CAPTURE           **Please note: ICD descriptions below are intended for billing purposes only and may not represent clinical diagnoses**        Primary Diagnosis:         272.2 Mixed hyperlipidemia            E78.2    Mixed hyperlipidemia              Orders:          [id number]   Office/outpatient visit; established patient, level 4  (In-House)             APPTO   Appointment need  (In-House)           401.1 Hypertension            I10    Essential (primary) hypertension    790.29 Prediabetes            R73.02    Impaired glucose tolerance (oral)    715.09 Generalized osteoarthritis, multiple sites            M15.0    Primary generalized (osteo)arthritis     715.16 Osteoarthritis of knee            M17.11    Unilateral primary osteoarthritis, right knee    300.3 Obsessive-compulsive disorder            F42.8    Other obsessive-compulsive disorder    274.9 Gout            M10.09    Idiopathic gout, multiple sites    427.89 Bradycardia            R00.1    Bradycardia, unspecified    783.21 Unexplained weight loss            R63.4    Abnormal weight loss    V76.44 Screening for prostate cancer            Z12.5    Encounter for screening for malignant neoplasm of prostate    V76.51 Screening for cancer of colon            Z12.11    Encounter for screening for malignant neoplasm of colon    V04.81 Vaccination against other viral diseases, Influenza            Z23    Encounter for immunization              Orders:          [id number]   Fluzone High Dose  (In-House)                                           Administration of influenza virus vaccine (x1)

## 2021-05-18 NOTE — PROGRESS NOTES
Billy LarryTiana 1934     Office/Outpatient Visit    Visit Date: Fri, Oct 19, 2018 12:25 pm    Provider: Ramon Herrera MD (Assistant: Kristen Alberts MA)    Location: [facility]        Electronically signed by Ramon Herrera MD on  10/25/2018 08:36:56 PM                             SUBJECTIVE:        CC: PATIENT IS NOT TAKING VIT C     Ced is a 84 year old White male.  This is a follow-up visit.  The patient is accompanied into the exam room by his self.  med refills PATIENT WOULD LIKE CLOTRIMAZOLE-BETHAMETH 05% CREAM REFILLED         HPI:         Patient presents with mixed hyperlipidemia.  Compliance with treatment has been good.  He specifically denies associated symptoms, including muscle pain and weakness.          Additionally, he presents with history of hypertension.  he did not bring his blood pressure diary, but says that pressures have been okay.  He is tolerating the medication well without side effects.  Compliance with treatment has been good.  He only brought in 2 reading 139-63-59 and 120-58-55.     Has hx of pre-diabetes. He doesn't ck BS at home     He is asking for refill of the cream he was given for balanitis in past.  The tube he has exp 9/2015.  He obviously doesn't use very often, but says it is very effective.     ROS:     CONSTITUTIONAL:  Negative for chills, fatigue, fever and weight change.      CARDIOVASCULAR:  Negative for chest pain, orthopnea, paroxysmal nocturnal dyspnea and pedal edema.      RESPIRATORY:  Negative for dyspnea and cough.      GASTROINTESTINAL:  Negative for abdominal pain, heartburn, constipation, diarrhea, and stool changes.      GENITOURINARY:  Negative for dysuria and polyuria.      PSYCHIATRIC:  Negative for anxiety and depression.          PMH/FMH/SH:     Last Reviewed on 6/19/2018 10:56 AM by Ramon Herrera    Past Medical History:         Positive for    Peptic Ulcer Disease: dx'd in 5/2011; dx by EGD; significant GI Bleeding; ;          CURRENT MEDICAL PROVIDERS:    Orthopedist: dr. escalante    vascular, dr. stephenson         PREVENTIVE HEALTH MAINTENANCE             COLORECTAL CANCER SCREENING:; 2011         Surgical History:         Joint Replacement: knee,right; ,;      Carotid Endarterectomy: bilateral; , 2011;     Removal Giant Nevus- left chest- 3/11;     Procedures:    left eye cataract 2018 FRANCISCO TRIPLETT         Family History:     Father:  at age 40; Cause of death was GSW     Mother: Myocardial Infarction ( 81 yo )     Sister(s): Healthy         Social History:     Occupation: Retired (Prior occupation: ,)         Tobacco/Alcohol/Supplements:     Last Reviewed on 2018 10:39 AM by Michael Pierre    Tobacco: He has never smoked.          Alcohol: Frequency: Daily When he drinks, the average quantity of alcohol is 2-3 drinks.          Substance Abuse History:     Last Reviewed on 2016 02:56 PM by Alycia Hou        Mental Health History:     Last Reviewed on 2016 02:56 PM by Alycia Hou        Communicable Diseases (eg STDs):     Last Reviewed on 2016 02:56 PM by Alycia Hou            Allergies:     Last Reviewed on 2017 09:47 AM by Hiral Gutierrez    Meloxicam:        Current Medications:     Last Reviewed on 10/19/2018 12:33 PM by Kristen Alberts    Bystolic 10mg Tablet Take 1 tablet(s) by mouth daily     Allopurinol 300mg Tablet Take 1 tablet(s) by mouth daily     Amlodipine  10mg Tablet Take 1 tablet(s) by mouth daily     Atorvastatin Calcium 20mg Tablet Take 1 tablet(s) by mouth daily     Lisinopril 40mg Tablet Take 1 tablet(s) by mouth daily     Metformin HCl 500mg Tablets, Extended Release Take 1 tablet(s) by mouth daily     Aspirin (ASA) 81mg Tablets, Enteric Coated 1 tab daily     Vitamin C 500mg Chewable Tablet Chew 1 tablet(s) by mouth daily     Vitamin D3 1,000IU Capsules take 1 capsule daily     Ferrous Sulfate 325mg Tablets 1 TAB DAILY     Lumigan  0.01% Ophthalmic Solution Instill 1 drop(s) to affected eye(s) each evening     Timolol Maleate 0.5% Ophthalmic Solution 1 drop both eye daily         OBJECTIVE:        Vitals:         Current: 10/19/2018 12:36:33 PM    Ht:  5 ft, 8.25 in;  Wt: 189 lbs;  BMI: 28.5    T: 97.4 F (oral);  BP: 129/71 mm Hg (left arm, sitting);  P: 62 bpm (left arm (BP Cuff), sitting);  sCr: 1.09 mg/dL;  GFR: 49.65        Exams:     PHYSICAL EXAM:     GENERAL:  well developed and nourished; appropriately groomed; in no apparent distress;     EYES: Nonicteric and with unremarkable lids, iris and pupils;     E/N/T: EARS: external auditory canal occluded by cerumen bilateral partially;  OROPHARYNX:  normal mucosa, dentition, gingiva, and posterior pharynx;     NECK: carotid exam reveals no bruits;     RESPIRATORY: normal respiratory rate and pattern with no distress; normal breath sounds with no rales, rhonchi, wheezes or rubs;     CARDIOVASCULAR: normal rate; rhythm is regular;  a systolic murmur is noted: it is grade 2/6;     LYMPHATIC: no enlargement of cervical or facial nodes;     SKIN: thick and long yellow toenail right foot.;     MUSCULOSKELETAL: right knee does not extend fully, he walks with cane.;     NEUROLOGIC: No lateralizing deficit.;     PSYCHIATRIC:  appropriate affect and demeanor; normal speech pattern; grossly normal memory;         Procedures:     Cerumen impaction     Cerumen impaction is noted in both ears The degree of wax accumulation is moderate in the left ear and right ear.  With ear currette, the wax is removed.  Removed from ear was hard balls of wax.  The patient tolerated the procedure reasonably well.  Complications were discomfort.  Performed by:[facility]             ASSESSMENT           272.2   E78.2  Mixed hyperlipidemia              DDx:     401.1   I10  Hypertension              DDx:     790.29   R73.02  Prediabetes              DDx:     607.1   N47.6   N48.1  Balanitis              DDx:     380.4   H61.23   Cerumen impaction              DDx:         ORDERS:         Meds Prescribed:       Betamethasone/Clotrimazole 0.05%/1% Cream Apply small amount to affected area bid prn  #15 (Fifteen) gm Refills: 0       Refill of: Bystolic (Nebivolol) 10mg Tablet Take 1 tablet(s) by mouth daily  #30 (Thirty) tablet(s) Refills: 2       Refill of: Allopurinol 300mg Tablet Take 1 tablet(s) by mouth daily  #30 (Thirty) tablet(s) Refills: 2       Refill of: Amlodipine  10mg Tablet Take 1 tablet(s) by mouth daily  #30 (Thirty) tablet(s) Refills: 2       Refill of: Atorvastatin Calcium 20mg Tablet Take 1 tablet(s) by mouth daily  #30 (Thirty) tablet(s) Refills: 2       Refill of: Lisinopril 40mg Tablet Take 1 tablet(s) by mouth daily  #30 (Thirty) tablet(s) Refills: 2       Refill of: Metformin HCl 500mg Tablets, Extended Release Take 1 tablet(s) by mouth daily  #30 (Thirty) tablet(s) Refills: 2         Lab Orders:       [id number]  DIAB1 - [facility] LIPID,CMP, A1C: 66276, 92479, 36395  (Send-Out)         APPTO  Appointment need  (In-House)           Procedures Ordered:       [id number]  Removal of impacted cerumen right ear (NURSE)  (In-House)           Other Orders:       [id number]  Removal of impacted cerumen left ear (NURSE)  (In-House)                   PLAN: recommend Hep A vacc and High Dose flu          Mixed hyperlipidemia         FOLLOW-UP: Schedule a follow-up visit in 6 months..            Orders:       APPTO  Appointment need  (In-House)            Hypertension           Prescriptions:       Refill of: Bystolic (Nebivolol) 10mg Tablet Take 1 tablet(s) by mouth daily  #30 (Thirty) tablet(s) Refills: 2       Refill of: Allopurinol 300mg Tablet Take 1 tablet(s) by mouth daily  #30 (Thirty) tablet(s) Refills: 2       Refill of: Amlodipine  10mg Tablet Take 1 tablet(s) by mouth daily  #30 (Thirty) tablet(s) Refills: 2       Refill of: Atorvastatin Calcium 20mg Tablet Take 1 tablet(s) by mouth daily  #30 (Thirty) tablet(s) Refills: 2       Refill  of: Lisinopril 40mg Tablet Take 1 tablet(s) by mouth daily  #30 (Thirty) tablet(s) Refills: 2       Refill of: Metformin HCl 500mg Tablets, Extended Release Take 1 tablet(s) by mouth daily  #30 (Thirty) tablet(s) Refills: 2          Prediabetes     LABORATORY:  Labs ordered to be performed today include Diabetes Panel 1; CMP, Lipid, A1C.            Orders:       [id number]  DIAB - [facility] LIPID,CMP, A1C: 37129, 41627, 12778  (Send-Out)            Balanitis           Prescriptions:       Betamethasone/Clotrimazole 0.05%/1% Cream Apply small amount to affected area bid prn  #15 (Fifteen) gm Refills: 0          Cerumen impaction           Orders:       [id number]  Removal of impacted cerumen left ear (NURSE)  (In-House)         [id number]  Removal of impacted cerumen right ear (NURSE)  (In-House)               Patient Recommendations:        For  Mixed hyperlipidemia:     Schedule a follow-up visit in 6 months.                APPOINTMENT INFORMATION:        Monday Tuesday Wednesday Thursday Friday Saturday Sunday            Time:___________________AM  PM   Date:_____________________             CHARGE CAPTURE           **Please note: ICD descriptions below are intended for billing purposes only and may not represent clinical diagnoses**        Primary Diagnosis:         272.2 Mixed hyperlipidemia            E78.2    Mixed hyperlipidemia              Orders:          [id number]   Office/outpatient visit; established patient, level 4  (In-House)             APPTO   Appointment need  (In-House)           401.1 Hypertension            I10    Essential (primary) hypertension    790.29 Prediabetes            R73.02    Impaired glucose tolerance (oral)    607.1 Balanitis            N47.6    Balanoposthitis           N48.1    Balanitis    380.4 Cerumen impaction            H61.23    Impacted cerumen, bilateral              Orders:          [id number]   Removal of impacted cerumen left ear (NURSE)  (In-House)             [id number]   Removal of  impacted cerumen right ear (NURSE)  (In-House)               ADDENDUMS:      ____________________________________    Date: 12/14/2018 01:21 PM    Author: Yecenia Campuzano         Visit Note Faxed to:        Physical  (PTA), [facility]; Number [phone]

## 2021-05-18 NOTE — PROGRESS NOTES
Billy Larry  1934     Office/Outpatient Visit    Visit Date: Fri, Jun 5, 2020 10:25 am    Provider: Ramon Herrera MD (Assistant: Minda Fairbanks MA)    Location: [facility]        Electronically signed by Ramon Herrera MD on  06/05/2020 05:52:34 PM                             Subjective:        CC: Ced is a 86 year old White male.  This is a follow-up visit.  3 months, pt says he only takes 1/2 tab of atorvastatin         HPI:           Ced presents with essential (primary) hypertension.  Compliance with treatment has been good.  He is tolerating the medication well without side effects.  He did not bring his blood pressure diary, but says that pressures have been okay.  He does still have some fluctuation of blood pressure but generally does okay.          Mixed hyperlipidemia details; compliance with treatment has been good.  He specifically denies associated symptoms, including muscle pain and weakness.  He has only been taking half of the 20 mg atorvastatin for quite a while.  Would rather get a prescription for just the 10 mg tablet      In regards to his osteoarthritis he continues to have considerable pain and discomfort especially in the right knee.  He says that Dr. Oleary, Orthopedist [place] saw him in February and they ended up deciding just to treat his symptoms with pain medications rather than a surgical intervention.  He says he thinks is gotten to the point he is going to need those medications.  Pain is particularly bad at nighttime.      Last time I saw him he had lost weight but that seems to have stabilized.  He says his appetite is okay.  Weight loss is probably been beneficial in the long run for his blood sugars, blood pressure, and even his osteoarthritis      Says the ulcer on his heel has resolvedOnce I had him remove his shoe though I saw that he had a persistent callus.      In regards to the femur fracture seems like he is getting around  pretty well.  Ambulating with a cane.His right knee seems to be the main thing that causes him trouble now.      What issue he is having is urinary frequency.  Seems to be getting a little worse he says.  Bothers him particularly at nighttime.  There is no dysuria.  Urinary stream seems to be diminished as well.    ROS:     CONSTITUTIONAL:  Negative for chills, fatigue and fever.      CARDIOVASCULAR:  Negative for chest pain, orthopnea, paroxysmal nocturnal dyspnea and pedal edema.      RESPIRATORY:  Negative for dyspnea and cough.      GASTROINTESTINAL:  Positive for constipation.   Negative for abdominal pain, diarrhea or nausea.      GENITOURINARY:  Negative for dysuria and polyuria.      PSYCHIATRIC:  Negative for anxiety and depression.          Past Medical History / Family History / Social History:         Last Reviewed on 2020 11:29 AM by Ramon Herrera    Past Medical History:         Positive for    Peptic Ulcer Disease: dx'd in 2011; dx by EGD; significant GI Bleeding; ;         CURRENT MEDICAL PROVIDERS:    Orthopedist: dr. escalante    vascular, dr. stephenson         PREVENTIVE HEALTH MAINTENANCE             COLORECTAL CANCER SCREENING:; 2011         Surgical History:         Joint Replacement: knee,right; ,;     Carotid Endarterectomy: bilateral; , 2011;     Removal Giant Nevus- left chest- 3/11;    Right Hip Fx Repair UL- Dec 2019;     Procedures:    left eye cataract 2018 FR[facility]CO TRIPLETT         Family History:     Father:  at age 40; Cause of death was GSW     Mother: Myocardial Infarction ( 81 yo )     Sister(s): Healthy         Social History:     Occupation: Retired (Prior occupation: ,)         Tobacco/Alcohol/Supplements:     Last Reviewed on 3/02/2020 02:05 PM by Spurling, Sarah C    Tobacco: He has never smoked.          Alcohol: Frequency: Daily When he drinks, the average quantity of alcohol is 2-3 drinks.          Substance Abuse History:     Last  Reviewed on 7/25/2016 02:56 PM by Alycia Hou        Mental Health History:     Last Reviewed on 7/25/2016 02:56 PM by Alycia Hou        Communicable Diseases (eg STDs):     Last Reviewed on 7/25/2016 02:56 PM by Alycia Hou        Allergies:     Last Reviewed on 3/02/2020 02:04 PM by Spurling, Sarah C    Meloxicam:          Current Medications:     Last Reviewed on 6/05/2020 10:31 AM by Minda Fairbanks    lisinopriL 40 mg oral tablet [TAKE ONE TABLET BY MOUTH DAILY]    aspirin 81 mg oral tablet, delayed release (enteric coated) [1 tab daily]    amLODIPine 10 mg oral tablet [TAKE ONE TABLET BY MOUTH DAILY]    Timolol Maleate 0.5 % ophthalmic (eye) Drops [1 drop both eye daily]    Lumigan 0.01 % ophthalmic (eye) Drops [Instill 1 drop(s) to affected eye(s) each evening]    Vitamin D3 1,000 unit oral capsule [take 1 capsule daily]    Vitamin C 500 mg oral tablet,chewable [Chew 1 tablet(s) by mouth daily]    atorvastatin 20 mg oral tablet [TAKE ONE TABLET BY MOUTH DAILY]    silver sulfadiazine 1 % Topical Cream [Wash wound, dry, and then apply cream at daily dressing change. ]        Objective:        Vitals:         Current: 6/5/2020 10:33:43 AM    Ht:  5 ft, 8.25 in;  Wt: 162.8 lbs;  BMI: 24.6T: 97.8 F (oral);  BP: 133/70 mm Hg (left arm, sitting);  P: 66 bpm (left arm (BP Cuff), sitting);  sCr: 0.86 mg/dL;  GFR: 57.05        Exams:     PHYSICAL EXAM:     GENERAL:  well developed and nourished; appropriately groomed; in no apparent distress;     EYES: Nonicteric and with unremarkable lids, iris and pupils;     E/N/T:  normal EACs, TMs, nasal/oral mucosa, teeth, gingiva, and oropharynx;     NECK: thyroid exam reveals no discrete nodules;  carotid exam reveals no bruits;     RESPIRATORY: normal respiratory rate and pattern with no distress; normal breath sounds with no rales, rhonchi, wheezes or rubs;     CARDIOVASCULAR: normal rate; rhythm is regular;  a systolic murmur is noted: it is grade 2/6;      LYMPHATIC: no enlargement of cervical or facial nodes;     SKIN: He does have an callused area on the left heel (see photo).;     MUSCULOSKELETAL: right knee does not extend fully, he walks with cane.;     NEUROLOGIC: No lateralizing deficit.;     PSYCHIATRIC:  appropriate affect and demeanor; normal speech pattern; grossly normal memory;         Lab/Test Results:         Urine temperature: confirmed (06/05/2020),     All urine drug screen levels confirmed negative: yes (06/05/2020),     Performed by: [facility] (06/05/2020),     Collection Time: 1:55 (06/05/2020),             Assessment:         I10   Essential (primary) hypertension       E78.2   Mixed hyperlipidemia       M15.0   Primary generalized (osteo)arthritis       F42.8   Other obsessive-compulsive disorder       R63.4   Abnormal weight loss       L89.622   Pressure ulcer of left heel, stage 2       S72.041D   Displaced fracture of base of neck of right femur, subsequent encounter for closed fracture with routine healing       Z79.891   Long term (current) use of opiate analgesic       R35.0   Frequency of micturition           ORDERS:         Meds Prescribed:       [Refilled] atorvastatin 10 mg oral tablet [take 1 tablet (10 mg) by oral route once daily], #90 (ninety) tablets, Refills: 0 (zero)       [New Rx] finasteride 5 mg oral tablet [take 1 tablet (5 mg) by oral route once daily for prostate], #90 (ninety) tablets, Refills: 0 (zero)       [New Rx] traMADol 50 mg oral tablet [take 1 tablet (50 mg) by oral route QHS as needed for pain], #30 (thirty) tablets, Refills: 0 (zero)         Lab Orders:       [id number]  Drug test prsmv qual dir optical obs per day  (In-House)            64461  HTNLP - [facility] CMP AND LIPID: [id number], [id number]  (Send-Out)                      Plan:         Mixed hyperlipidemia    LABORATORY:  Labs ordered to be performed today include HTN/Lipid Panel: CMP, Lipid.            Prescriptions:       [Refilled] atorvastatin 10 mg oral tablet [take 1  tablet (10 mg) by oral route once daily], #90 (ninety) tablets, Refills: 0 (zero)           Orders:       [id number]  HTN - [facility] CMP AND LIPID: [id number], [id number]  (Send-Out)              Primary generalized (osteo)arthritis          Prescriptions:       [New Rx] traMADol 50 mg oral tablet [take 1 tablet (50 mg) by oral route QHS as needed for pain], #30 (thirty) tablets, Refills: 0 (zero)         Long term (current) use of opiate analgesic    Controlled substance documentation: Norman reviewed; drug screen performed and appropriate; consent is reviewed and signed and on the chart.  He is aware of risk of addiction on this medication, understands that he will need to follow up for a review every 3 months and his medications will be adjusted or decreased as deemed appropriate at each visit.  No history of drug or alcohol abuse.  No concerns about diversion or abuse. He denies side effects related to the medication.  He is aware that he may be called in for pill counts.  The dosing of this medication will be reviewed on a regular basis and reduced if possible..  Ongoing use of a controlled substance is necessary for this patient to have a normal quality of life     Drug screen Controlled Substance Contract has been ordered           Orders:       [id number]  Drug test prsmv qual dir optical obs per day  (In-House)              Frequency of micturition          Prescriptions:       [New Rx] finasteride 5 mg oral tablet [take 1 tablet (5 mg) by oral route once daily for prostate], #90 (ninety) tablets, Refills: 0 (zero)             Other Patient Education Handouts:     Dragon transcription disclaimer:        Much of this encounter note is an electronic transcription/translation of spoken language to printed text.  The electronic translation of spoken language may permit erroneous, or at times, nonsensical words or phrases to be inadvertently transcribed.  Although I have reviewed the note for such errors, some may still exist.         Charge Capture:         Primary Diagnosis:     I10  Essential (primary) hypertension           Orders:      [id number]  Office/outpatient visit; established patient, level 4  (In-House)              E78.2  Mixed hyperlipidemia     M15.0  Primary generalized (osteo)arthritis     F42.8  Other obsessive-compulsive disorder     R63.4  Abnormal weight loss     L89.622  Pressure ulcer of left heel, stage 2     S72.041D  Displaced fracture of base of neck of right femur, subsequent encounter for closed fracture with routine healing     Z79.891  Long term (current) use of opiate analgesic           Orders:      [id number]  Drug test prsmv qual dir optical obs per day  (In-House)              R35.0  Frequency of micturition

## 2021-05-18 NOTE — PROGRESS NOTES
Billy Larry 1934     Office/Outpatient Visit    Visit Date: Mon, Aug 19, 2019 01:59 pm    Provider: Ramon Herrera MD (Assistant: Sarah Spurling, MA)    Location: [facility]        Electronically signed by Ramon Herrera MD on  08/24/2019 10:11:25 AM                             SUBJECTIVE:        CC:     Ced is a 85 year old White male.  Blood pressure. No longer taking iron.          HPI:     Mr. Larry called into the office yesterday stating that he has had some readings from his blood pressure monitor that are concerning to him, and wanted to come in and review those.  He presents today with his health diary in hand (see photo).  He is been checking his blood pressures frequently sometimes several times a day.     Since June 14 he says he has had episodic readings that will report his heart rate as irregular.  Sometimes it will be reported as bradycardic down into the 40s.  The last few days his blood pressures been low with systolic below 110 so he has not even been taking any of his blood pressure medicines yesterday or today.     He does also have a history of prediabetes.  He checks his blood sugars rather frequently and readings, as documented int he diary,  are in general quite good.     ROS:     CONSTITUTIONAL:  Negative for chills, fatigue and fever.      CARDIOVASCULAR:  Negative for chest pain, orthopnea, paroxysmal nocturnal dyspnea and pedal edema.      RESPIRATORY:  Negative for dyspnea and cough.      GASTROINTESTINAL:  Negative for abdominal pain, heartburn, constipation, diarrhea, and stool changes.      GENITOURINARY:  Negative for dysuria and polyuria.      PSYCHIATRIC:  Negative for anxiety and depression.          [facility]/:     Last Reviewed on 6/19/2018 10:56 AM by Ramon Herrera    Past Medical History:         Positive for    Peptic Ulcer Disease: dx'd in 5/2011; dx by EGD; significant GI Bleeding; ;         CURRENT MEDICAL PROVIDERS:    [facility]thopedist:   [facility]    vascular, dr. stephenson         PREVENTIVE HEALTH MAINTENANCE             COLORECTAL CANCER SCREENING:; 2011         Surgical History:         Joint Replacement: knee,right; ,;      Carotid Endarterectomy: bilateral; , 2011;     Removal Giant Nevus- left chest- 3/11;     Procedures:    left eye cataract 2018 FRANCISCO TRIPLETT         Family History:     Father:  at age 40; Cause of death was GSW     Mother: Myocardial Infarction ( 81 yo )     Sister(s): Healthy         Social History:     Occupation: Retired (Prior occupation: ,)         Tobacco/Alcohol/Supplements:     Last Reviewed on 2019 09:09 AM by Amanda Boone    Tobacco: He has never smoked.          Alcohol: Frequency: Daily When he drinks, the average quantity of alcohol is 2-3 drinks.          Substance Abuse History:     Last Reviewed on 2016 02:56 PM by Alycia Hou        Mental Health History:     Last Reviewed on 2016 02:56 PM by Alycia Hou        Communicable Diseases (eg STDs):     Last Reviewed on 2016 02:56 PM by Alycia Hou            Allergies:     Last Reviewed on 2019 09:09 AM by Amanda Boone    Meloxicam:        Current Medications:     Last Reviewed on 2019 09:09 AM by Amanda Boone    Atorvastatin Calcium 20mg Tablet Take 1 tablet(s) by mouth daily     Lisinopril 40mg Tablet Take 1 tablet(s) by mouth daily     Allopurinol 300mg Tablet Take 1 tablet(s) by mouth daily     Metformin HCl 500mg Tablets, Extended Release Take 1 tablet(s) by mouth daily     Amlodipine  10mg Tablet Take 1 tablet(s) by mouth daily     Bystolic 10mg Tablet Take 1 tablet(s) by mouth daily     Aspirin (ASA) 81mg Tablets, Enteric Coated 1 tab daily     Vitamin C 500mg Chewable Tablet Chew 1 tablet(s) by mouth daily     Vitamin D3 1,000IU Capsules take 1 capsule daily     Ferrous Sulfate 325mg Tablets 1 TAB DAILY     Lumigan 0.01% Ophthalmic Solution Instill 1 drop(s) to affected eye(s) each  evening     Timolol Maleate 0.5% Ophthalmic Solution 1 drop both eye daily         OBJECTIVE:        Vitals:         Current: 8/19/2019 2:06:51 PM    Ht:  5 ft, 8.25 in;  Wt: 179.8 lbs;  BMI: 27.1    T: 98.6 F (oral);  BP: 155/68 mm Hg (left arm, sitting);  P: 69 bpm (left arm (BP Cuff), sitting);  sCr: 0.84 mg/dL;  GFR: 62.00        Repeat:     2:07:34 PM     BP:   147/71mm Hg (right arm, sitting, Second take.)         Exams:     PHYSICAL EXAM:     GENERAL:  well developed and nourished; appropriately groomed; in no apparent distress;     EYES: Nonicteric and with unremarkable lids, iris and pupils;     NECK: thyroid exam reveals no discrete nodules;  carotid exam reveals no bruits;     RESPIRATORY: normal respiratory rate and pattern with no distress; normal breath sounds with no rales, rhonchi, wheezes or rubs;     CARDIOVASCULAR: normal rate; rhythm is regular;  a systolic murmur is noted: it is grade 2/6;     LYMPHATIC: no enlargement of cervical or facial nodes;     MUSCULOSKELETAL: right knee does not extend fully, he walks with cane.;     NEUROLOGIC: No lateralizing deficit.;     PSYCHIATRIC:  appropriate affect and demeanor; normal speech pattern; grossly normal memory;         Lab/Test Results:         LABORATORY RESULTS: EKG performed by tls         ASSESSMENT           427.9   I49.9  Cardiac dysrhythmia              DDx:     272.2   E78.2  Mixed hyperlipidemia              DDx:     401.1   I10  Hypertension              DDx:     790.29   R73.02  Prediabetes              DDx:         ORDERS:         Radiology/Test Orders:       [id number]  Electrocardiogram, routine with at least 12 leads; with interpretation and report  (In-House)         43340  Holter monitor connection and disconnection  (Send-Out)  (pt to return for holer placement)         Lab Orders:       [id number]  BDCB2 - [facility] CBC w/o diff  (Send-Out)         49493  TSH - [facility] TSH  (Send-Out)         05499  DIAB1 - [facility] LIPID,CMP, A1C: 31921, 12961, 38924   (Send-Out)                   PLAN:          Cardiac dysrhythmiaNot sure exactly what is going on but he has documentation of some bradycardia and low blood pressures.  His EKG here looks okay.  We will see if we get him arrange for a Holter monitor to capture any bradycardic events.  We will get some labs and until I see those results he can go ahead and hold his blood pressure medicines.  He certainly has a prior history of erratic blood pressure control in the past, but usually in the direction of being too high.     LABORATORY:  Labs ordered to be performed today include CBC W/O DIFF and TSH.      TESTS/PROCEDURES:  Will proceed with an ECG and Holter Monitor 48 hour to be performed/scheduled now.            Orders:       [id number]  Electrocardiogram, routine with at least 12 leads; with interpretation and report  (In-House)         62407  Holter monitor connection and disconnection  (Send-Out)  (pt to return for holer placement)       58020  BDCB2 - [facility] CBC w/o diff  (Send-Out)         28483  TSH - [facility] TSH  (Send-Out)            Mixed hyperlipidemia     LABORATORY:  Labs ordered to be performed today include Diabetes Panel 1; CMP, Lipid, A1C.            Orders:       [id number]  DIAB1 - [facility] LIPID,CMP, A1C: 09428, 12878, 39955  (Send-Out)            Prediabetes Will ck lab to see how a1c is doing             Patient Recommendations:    Dragon transcription disclaimer:        Much of this encounter note is an electronic transcription/translation of spoken language to printed text.  The electronic translation of spoken language may permit erroneous, or at times, nonsensical words or phrases to be inadvertently transcribed.  Although I have reviewed the note for such errors, some may still exist.             CHARGE CAPTURE           **Please note: ICD descriptions below are intended for billing purposes only and may not represent clinical diagnoses**        Primary Diagnosis:         427.9 Cardiac dysrhythmia             I49.9    Cardiac arrhythmia, unspecified              Orders:          [id number]   Office/outpatient visit; established patient, level 4  (In-House)             49467   Electrocardiogram, routine with at least 12 leads; with interpretation and report  (In-House)           272.2 Mixed hyperlipidemia            E78.2    Mixed hyperlipidemia    401.1 Hypertension            I10    Essential (primary) hypertension    790.29 Prediabetes            R73.02    Impaired glucose tolerance (oral)

## 2021-05-18 NOTE — PROGRESS NOTES
Billy LarryTiana 1934     Office/Outpatient Visit    Visit Date: Tue, Jun 19, 2018 10:26 am    Provider: Ramon Herrera MD (Assistant: Michael Pierre LPN)    Location: [facility]        Electronically signed by Ramon Herrera MD on  06/24/2018 02:28:32 PM                             SUBJECTIVE:        CC:     Ced is a 84 year old White male.  med refills         HPI:         Ced presents with mixed hyperlipidemia.  Compliance with treatment has been good.  He specifically denies associated symptoms, including muscle pain and weakness.          Additionally, he presents with history of hypertension.  he did not bring his blood pressure diary, but says that pressures have been okay.  He is tolerating the medication well without side effects.  Compliance with treatment has been good.      He has pre-diabetes and follows BS regularly and they are running below 120 most of the time. He is on Metformin and tolerating the medication without complaint.     Continues to have OA issues and right knee is still a mess and he is unable to extend the knee fully.  Walks with cane.  Wrestles with the option of yet another surgery on the knee.      His right toenails are causing pain in the toes because he can't reach the foot since he can't bend the knee.     He gout has actually done will on the current medication.     ROS:     CONSTITUTIONAL:  Negative for chills, fatigue, fever and weight change.      CARDIOVASCULAR:  Negative for chest pain, orthopnea, paroxysmal nocturnal dyspnea and pedal edema.      RESPIRATORY:  Negative for dyspnea and cough.      GASTROINTESTINAL:  Negative for abdominal pain, heartburn, constipation, diarrhea, and stool changes.      GENITOURINARY:  Negative for dysuria and polyuria.      PSYCHIATRIC:  Negative for anxiety and depression.          PMH/FMH/SH:     Last Reviewed on 7/25/2016 02:56 PM by Alycia Hou    Past Medical History:         Positive for    Peptic Ulcer  Disease: dx'd in 2011; dx by EGD; significant GI Bleeding; ;         CURRENT MEDICAL PROVIDERS:    Orthopedist: dr. escalante    vascular, dr. stephenson         Surgical History:         Joint Replacement: knee,right; ,;      Carotid Endarterectomy: bilateral; , 2011;     Removal Giant Nevus- left chest- 3/11; Procedures: colonoscopy May 2011         Family History:     Father:  at age 40; Cause of death was GSW     Mother: Myocardial Infarction ( 79 yo )     Sister(s): Healthy         Social History:     Occupation: Retired (Prior occupation: ,)         Tobacco/Alcohol/Supplements:     Last Reviewed on 2017 09:50 AM by Hiral Gutierrez    Tobacco: He has never smoked.          Alcohol: Frequency: Daily When he drinks, the average quantity of alcohol is 2-3 drinks.          Substance Abuse History:     Last Reviewed on 2016 02:56 PM by Alycia Hou        Mental Health History:     Last Reviewed on 2016 02:56 PM by Alycia Hou        Communicable Diseases (eg STDs):     Last Reviewed on 2016 02:56 PM by Alycia Hou            Allergies:     Last Reviewed on 2017 09:47 AM by Hiral Gutierrez    Meloxicam:        Current Medications:     Last Reviewed on 2017 09:50 AM by Hiral Gutierrez    Simvastatin 40mg Tablet Take 1 tablet(s) by mouth daily     Allopurinol 300mg Tablet Take 1 tablet(s) by mouth daily     Amlodipine  10mg Tablet Take 1 tablet(s) by mouth daily     Lisinopril 40mg Tablet Take 1 tablet(s) by mouth daily     Metformin HCl 500mg Tablets, Extended Release Take 1 tablet(s) by mouth daily     Bystolic 10mg Tablet Take 1 tablet(s) by mouth daily     Aspirin (ASA) 81mg Tablets, Enteric Coated 1 tab daily     Vitamin D3 1,000IU Capsules take 1 capsule daily     Ferrous Sulfate 325mg Tablets 1 TAB DAILY     Lumigan 0.01% Ophthalmic Solution Instill 1 drop(s) to affected eye(s) each evening     Timolol Maleate 0.5% Ophthalmic Solution 1 drop  both eye daily         OBJECTIVE:        Vitals:         Current: 6/19/2018 10:31:53 AM    Ht:  5 ft, 8.25 in;  Wt: 193.5 lbs;  BMI: 29.2    T: 98.2 F (oral);  BP: 114/66 mm Hg (right arm, sitting);  P: 61 bpm (right arm (BP Cuff), sitting);  sCr: 0.96 mg/dL;  GFR: 56.94        Exams:     PHYSICAL EXAM:     GENERAL:  well developed and nourished; appropriately groomed; in no apparent distress;     EYES: Nonicteric and with unremarkable lids, iris and pupils;     NECK: carotid exam reveals no bruits;     RESPIRATORY: normal respiratory rate and pattern with no distress; normal breath sounds with no rales, rhonchi, wheezes or rubs;     CARDIOVASCULAR: normal rate; rhythm is regular;  a systolic murmur is noted: it is grade 2/6;     LYMPHATIC: no enlargement of cervical or facial nodes;     SKIN: thick and long yellow toenail right foot.;     MUSCULOSKELETAL: right knee does not extend fully, he walks with cane.; right toes tender to compression around the nails.     NEUROLOGIC: No lateralizing deficit.;     PSYCHIATRIC:  appropriate affect and demeanor; normal speech pattern; grossly normal memory;         ASSESSMENT           272.2   E78.2  Mixed hyperlipidemia              DDx:     401.1   I10  Hypertension              DDx:     790.29   R73.02  Prediabetes              DDx:     715.09   M15.0  Generalized osteoarthritis, multiple sites              DDx:     715.16   M17.11  Osteoarthritis of knee              DDx:     300.3   F42.8  Obsessive-compulsive disorder              DDx:     729.5   M79.674  Toe pain              DDx:     110.1   B35.1  Tinea unguium              DDx:     274.9   M10.09  Gout              DDx:         ORDERS:         Meds Prescribed:       Atorvastatin Calcium 20mg Tablet Take 1 tablet(s) by mouth daily  #30 (Thirty) tablet(s) Refills: 2       Refill of: Amlodipine  10mg Tablet Take 1 tablet(s) by mouth daily  #30 (Thirty) tablet(s) Refills: 2       Refill of: Allopurinol 300mg Tablet Take  1 tablet(s) by mouth daily  #30 (Thirty) tablet(s) Refills: 0       Refill of: Lisinopril 40mg Tablet Take 1 tablet(s) by mouth daily  #30 (Thirty) tablet(s) Refills: 0       Refill of: Metformin HCl 500mg Tablets, Extended Release Take 1 tablet(s) by mouth daily  #30 (Thirty) tablet(s) Refills: 0       Refill of: Bystolic (Nebivolol) 10mg Tablet Take 1 tablet(s) by mouth daily  #30 (Thirty) tablet(s) Refills: 0         Lab Orders:       [id number]  [address] [place] [address] LIPID,CMP, A1C: [id number], [id number], [id number]  (Send-Out)         APPTO  Appointment need  (In-House)           Procedures Ordered:       [id number]  Trimming of nondystrophic nails, any number  (In-House)                   PLAN:          Mixed hyperlipidemia will switch to atorvastatin due to the amlodipine           Prescriptions:       Atorvastatin Calcium 20mg Tablet Take 1 tablet(s) by mouth daily  #30 (Thirty) tablet(s) Refills: 2           Patient Education Handouts:       [facility] Medication Compliance           Hypertension         FOLLOW-UP: Schedule a follow-up visit in 4 months..            Prescriptions:       Refill of: Amlodipine  10mg Tablet Take 1 tablet(s) by mouth daily  #30 (Thirty) tablet(s) Refills: 2           Orders:       APPTO  Appointment need  (In-House)            Prediabetes     LABORATORY:  Labs ordered to be performed today include Diabetes Panel 1; CMP, Lipid, A1C.            Orders:       [id number]  [address] [place] [address] LIPID,CMP, A1C: [id number], [id number], 95306  (Send-Out)            Generalized osteoarthritis, multiple sites continue supportive care and Tylenol.          Osteoarthritis of knee           Prescriptions:       Refill of: Allopurinol 300mg Tablet Take 1 tablet(s) by mouth daily  #30 (Thirty) tablet(s) Refills: 0       Refill of: Lisinopril 40mg Tablet Take 1 tablet(s) by mouth daily  #30 (Thirty) tablet(s) Refills: 0       Refill of: Metformin HCl 500mg Tablets, Extended Release Take 1 tablet(s) by mouth daily  #30 (Thirty) tablet(s) Refills: 0        Refill of: Bystolic (Nebivolol) 10mg Tablet Take 1 tablet(s) by mouth daily  #30 (Thirty) tablet(s) Refills: 0          Obsessive-compulsive disorder He is doing pretty good and seems less stressed over things that are beyond his control.          Toe pain hopefully the trimming of the toes will be helpful.           Orders:       [id number]  Trimming of nondystrophic nails, any number  (In-House)               Patient Recommendations:        For  Hypertension:     Schedule a follow-up visit in 4 months.                APPOINTMENT INFORMATION:        Monday Tuesday Wednesday Thursday Friday Saturday Sunday            Time:___________________AM  PM   Date:_____________________             CHARGE CAPTURE           **Please note: ICD descriptions below are intended for billing purposes only and may not represent clinical diagnoses**        Primary Diagnosis:         272.2 Mixed hyperlipidemia            E78.2    Mixed hyperlipidemia              Orders:          [id number] -25  Office/outpatient visit; established patient, level 4  (In-House)           401.1 Hypertension            I10    Essential (primary) hypertension              Orders:          APPTO   Appointment need  (In-House)           790.29 Prediabetes            R73.02    Impaired glucose tolerance (oral)    715.09 Generalized osteoarthritis, multiple sites            M15.0    Primary generalized (osteo)arthritis    715.16 Osteoarthritis of knee            M17.11    Unilateral primary osteoarthritis, right knee    300.3 Obsessive-compulsive disorder            F42.8    Other obsessive-compulsive disorder    729.5 Toe pain            M79.674    Pain in right toe(s)              Orders:          [id number]   Trimming of nondystrophic nails, any number  (In-House)           110.1 Tinea unguium            B35.1    Tinea unguium    274.9 Gout            M10.09    Idiopathic gout, multiple sites

## 2021-05-18 NOTE — PROGRESS NOTES
Billy Larry  1934     Office/Outpatient Visit    Visit Date: Mon, Jan 11, 2021 11:09 am    Provider: Ramon Herrera MD (Assistant: Chloe Penn MA)    Location: [facility]        Electronically signed by Ramon Herrera MD on  01/16/2021 05:03:19 PM                             Subjective:        CC: Ced is a 86 year old White male.  This is a follow-up visit.  pt states he is not taking iron and tramadol         HPI:       Mr. Larry is here to follow-up on his chronic health conditions.  He pretty much is stable.  Continues to have considerable discomfort in his knees especially the right one related to his osteoarthritis.  Blood pressure is remained fairly stable.  He monitors it frequently.  He is tolerating the atorvastatin for his cholesterol.  He is due for some lab work.    ROS:     CONSTITUTIONAL:  Negative for chills, fatigue and fever.      CARDIOVASCULAR:  Negative for chest pain, orthopnea, paroxysmal nocturnal dyspnea and pedal edema.      RESPIRATORY:  Negative for dyspnea and cough.      GASTROINTESTINAL:  Negative for abdominal pain, heartburn, constipation, diarrhea, and stool changes.      GENITOURINARY:  Negative for dysuria and polyuria.      PSYCHIATRIC:  Negative for anxiety and depression.          Past Medical History / Family History / Social History:         Last Reviewed on 9/14/2020 02:10 PM by Ramon Herrera    Past Medical History:         Positive for    Peptic Ulcer Disease: dx'd in 5/2011; dx by EGD; significant GI Bleeding; ;         CURRENT MEDICAL PROVIDERS:    Orthopedist: dr. escalante    vascular, dr. stephenson         PREVENTIVE HEALTH MAINTENANCE             COLORECTAL CANCER SCREENING:; 05/2011         Surgical History:         Joint Replacement: knee,right; 2011,2012;     Carotid Endarterectomy: bilateral; June, August 2011;     Removal Giant Nevus- left chest- 3/11;    Right Hip Fx Repair UL- Dec 2019;     Procedures:    left eye cataract  2018 FR[facility]CO TRIPLETT         Family History:     Father:  at age 40; Cause of death was GSW     Mother: Myocardial Infarction ( 79 yo )     Sister(s): Healthy         Social History:     Occupation: Retired (Prior occupation: ,)         Tobacco/Alcohol/Supplements:     Last Reviewed on 2020 10:43 AM by Nona Butt    Tobacco: He has never smoked.          Alcohol: Frequency: Daily When he drinks, the average quantity of alcohol is 2-3 drinks.          Substance Abuse History:     Last Reviewed on 2016 02:56 PM by Alycia Hou        Mental Health History:     Last Reviewed on 2016 02:56 PM by Alycia Hou        Communicable Diseases (eg STDs):     Last Reviewed on 2016 02:56 PM by Alycia Hou        Allergies:     Last Reviewed on 2021 11:16 AM by Chloe Penn    Meloxicam:          Current Medications:     Last Reviewed on 2021 11:16 AM by Chloe Penn    ferrous sulfate 325 mg (65 mg iron) oral tablet [take 1 tablet (325 mg) by oral route daily]    lisinopriL 30 mg oral tablet [take 1 tablet (30 mg) by oral route once daily]    aspirin 81 mg oral tablet, delayed release (enteric coated) [1 tab daily]    amLODIPine 10 mg oral tablet [TAKE ONE TABLET BY MOUTH DAILY]    Timolol Maleate 0.5 % ophthalmic (eye) Drops [1 drop both eye daily]    Lumigan 0.01 % ophthalmic (eye) Drops [Instill 1 drop(s) to affected eye(s) each evening]    atorvastatin 10 mg oral tablet [take 1 tablet (10 mg) by oral route once daily]    silver sulfadiazine 1 % Topical Cream [Wash wound, dry, and then apply cream at daily dressing change. ]    traMADol 50 mg oral tablet [take 1 tablet (50 mg) by oral route QHS as needed for pain]        Objective:        Vitals:         Current: 2021 11:18:09 AM    Ht:  5 ft, 8.25 in;  Wt: 173.6 lbs;  BMI: 26.2T: 98.1 F (temporal);  BP: 139/75 mm Hg (left arm, sitting);  P: 68 bpm (left arm (BP Cuff), sitting);  sCr: 0.97 mg/dL;  GFR:  51.98        Exams:     PHYSICAL EXAM:     GENERAL:  well developed and nourished; appropriately groomed; in no apparent distress;     EYES: Nonicteric and with unremarkable lids, iris and pupils;     E/N/T: EARS:  normal external auditory canals and tympanic membranes;  grossly normal hearing; OROPHARYNX:  normal mucosa, dentition, gingiva, and posterior pharynx;     NECK: thyroid exam reveals no discrete nodules;  carotid exam reveals no bruits;     RESPIRATORY: normal respiratory rate and pattern with no distress; normal breath sounds with no rales, rhonchi, wheezes or rubs;     CARDIOVASCULAR: normal rate; rhythm is regular;  a systolic murmur is noted: it is grade 2/6;     LYMPHATIC: no enlargement of cervical or facial nodes;     MUSCULOSKELETAL: right knee does not extend fully, he walks with cane.;     NEUROLOGIC: No lateralizing deficit.;     PSYCHIATRIC:  appropriate affect and demeanor; normal speech pattern; grossly normal memory;         Assessment:         I10   Essential (primary) hypertension       E78.2   Mixed hyperlipidemia       M15.0   Primary generalized (osteo)arthritis           ORDERS:         Meds Prescribed:       [Refilled] lisinopriL 30 mg oral tablet [take 1 tablet (30 mg) by oral route once daily], #90 (ninety) tablets, Refills: 0 (zero)       [Refilled] atorvastatin 10 mg oral tablet [take 1 tablet (10 mg) by oral route once daily], #90 (ninety) tablets, Refills: 0 (zero)         Lab Orders:       [id number]  HTN - [facility] CMP AND LIPID: [id number], [id number]  (Send-Out)                      Plan:         Essential (primary) hypertensionBlood pressure looks okay continue current regimen          Prescriptions:       [Refilled] lisinopriL 30 mg oral tablet [take 1 tablet (30 mg) by oral route once daily], #90 (ninety) tablets, Refills: 0 (zero)         Mixed hyperlipidemiaCheck lab predicate medication changes based on results    LABORATORY:  Labs ordered to be performed today include HTN/Lipid Panel:  CMP, Lipid.            Prescriptions:       [Refilled] atorvastatin 10 mg oral tablet [take 1 tablet (10 mg) by oral route once daily], #90 (ninety) tablets, Refills: 0 (zero)           Orders:       [id number]  HTN - [facility] CMP AND LIPID: [id number], [id number]  (Send-Out)              Primary generalized (osteo)arthritiscont current management, follow with ortho as needed            Other Patient Education Handouts:     Dragon transcription disclaimer:        Much of this encounter note is an electronic transcription/translation of spoken language to printed text.  The electronic translation of spoken language may permit erroneous, or at times, nonsensical words or phrases to be inadvertently transcribed.  Although I have reviewed the note for such errors, some may still exist.        Charge Capture:         Primary Diagnosis:     I10  Essential (primary) hypertension           Orders:      [id number]  Office/outpatient visit; established patient, level 3  (In-House)              E78.2  Mixed hyperlipidemia     M15.0  Primary generalized (osteo)arthritis         ADDENDUMS:      ____________________________________    Addendum: 01/21/2021 04:06 PM - Ramon Herrera            Remove   [id number]        Add   [id number]

## 2021-05-18 NOTE — PROGRESS NOTES
Billy Larry  1934     Office/Outpatient Visit    Visit Date: Mon, Sep 14, 2020 10:39 am    Provider: Ramon Herrera MD (Assistant: Nona Butt LPN)    Location: [facility]        Electronically signed by Ramon Herrera MD on  09/14/2020 02:12:05 PM                             Subjective:        CC: Ced is a 86 year old White male.  Follow up 3 months         HPI:           Patient to be evaluated for essential (primary) hypertension.  Compliance with treatment has been good.  He is tolerating the medication well without side effects.  He did not bring his blood pressure diary, but says that pressures have been okay.  He checks his blood pressure quite often at home generally it is pretty good          Additionally, he presents with history of mixed hyperlipidemia.  compliance with treatment has been good.  He specifically denies associated symptoms, including muscle pain and weakness.            PHQ-9 Depression Screening: Completed form scanned and in chart; Total Score 2       He has severe osteoarthritis and the right knee is postsurgical with a failed surgical outcome.Last time he was here we went on put him on tramadol because he was having enough pain keeping him awake at night.  He says that he stopped tramadol because he thought it might be contributing to some palpitations, and says that once he got off of the tramadol the palpitations resolved. So, for now he doesn't want  to take TramadolHe said he had a follow-up visit with his orthopedic surgeon at [facility] within the past couple of months but we do not have copies of that record.      He also was given finasteride at the last office visit to treat some nocturia and urinary frequency issues.  He discontinued that medicine as well since he developed the palpitations.  Hard to tell if either of the medications might have been related to his symptoms but at this point he is not really interested in repeat trial.  He  says his urinary frequency is usually about twice a night at present.      Longstanding history of prediabetes for which she does a great job with diet and weight control.    ROS:     CONSTITUTIONAL:  Negative for chills, fatigue and fever.      CARDIOVASCULAR:  Negative for chest pain, orthopnea, paroxysmal nocturnal dyspnea and pedal edema.      RESPIRATORY:  Negative for dyspnea and cough.      GASTROINTESTINAL:  Negative for abdominal pain, constipation, diarrhea and nausea.      GENITOURINARY:  Negative for dysuria and polyuria.      PSYCHIATRIC:  Negative for anxiety and depression.          Past Medical History / Family History / Social History:         Last Reviewed on 2020 02:10 PM by Ramon Herrera    Past Medical History:         Positive for    Peptic Ulcer Disease: dx'd in 2011; dx by EGD; significant GI Bleeding; ;         CURRENT MEDICAL PROVIDERS:    Orthopedist: dr. escalante    vascular, dr. stephenson         PREVENTIVE HEALTH MAINTENANCE             COLORECTAL CANCER SCREENING:; 2011         Surgical History:         Joint Replacement: knee,right; ,;     Carotid Endarterectomy: bilateral; , 2011;     Removal Giant Nevus- left chest- 3/11;    Right Hip Fx Repair UL- Dec 2019;     Procedures:    left eye cataract 2018 FRANCISCO TRIPLETT         Family History:     Father:  at age 40; Cause of death was GSW     Mother: Myocardial Infarction ( 79 yo )     Sister(s): Healthy         Social History:     Occupation: Retired (Prior occupation: ,)         Tobacco/Alcohol/Supplements:     Last Reviewed on 2020 10:43 AM by Nona Butt    Tobacco: He has never smoked.          Alcohol: Frequency: Daily When he drinks, the average quantity of alcohol is 2-3 drinks.          Substance Abuse History:     Last Reviewed on 2016 02:56 PM by Alycia Hou        Mental Health History:     Last Reviewed on 2016 02:56 PM by Alycia Hou        Communicable  Diseases (eg STDs):     Last Reviewed on 7/25/2016 02:56 PM by Alycia Hou        Allergies:     Last Reviewed on 3/02/2020 02:04 PM by Spurling, Sarah C    Meloxicam:          Current Medications:     Last Reviewed on 6/05/2020 10:31 AM by Minda Fairbanks    lisinopriL 40 mg oral tablet [TAKE ONE TABLET BY MOUTH DAILY]    aspirin 81 mg oral tablet, delayed release (enteric coated) [1 tab daily]    amLODIPine 10 mg oral tablet [TAKE ONE TABLET BY MOUTH DAILY]    Timolol Maleate 0.5 % ophthalmic (eye) Drops [1 drop both eye daily]    Lumigan 0.01 % ophthalmic (eye) Drops [Instill 1 drop(s) to affected eye(s) each evening]    atorvastatin 10 mg oral tablet [take 1 tablet (10 mg) by oral route once daily]    silver sulfadiazine 1 % Topical Cream [Wash wound, dry, and then apply cream at daily dressing change. ]    traMADol 50 mg oral tablet [take 1 tablet (50 mg) by oral route QHS as needed for pain]    ferrous sulfate 325 mg (65 mg iron) oral tablet [take 1 tablet (325 mg) by oral route daily]        Objective:        Vitals:         Current: 9/14/2020 10:54:50 AM    Ht:  5 ft, 8.25 in;  Wt: 165.8 lbs;  BMI: 25.0T: 97.8 F (oral);  BP: 133/63 mm Hg (left arm, sitting);  P: 63 bpm (left arm (BP Cuff), sitting);  sCr: 0.89 mg/dL;  GFR: 55.55        Exams:     PHYSICAL EXAM:     GENERAL:  well developed and nourished; appropriately groomed; in no apparent distress;     EYES: Nonicteric and with unremarkable lids, iris and pupils;     E/N/T: EARS: external auditory canal occluded by cerumen on the right;  left TM is normal;  OROPHARYNX:  normal mucosa, dentition, gingiva, and posterior pharynx;     NECK: thyroid exam reveals no discrete nodules;  carotid exam reveals no bruits;     RESPIRATORY: normal respiratory rate and pattern with no distress; normal breath sounds with no rales, rhonchi, wheezes or rubs;     CARDIOVASCULAR: normal rate; rhythm is regular;  a systolic murmur is noted: it is grade 2/6;      LYMPHATIC: no enlargement of cervical or facial nodes;     MUSCULOSKELETAL: right knee does not extend fully, he walks with cane.;     NEUROLOGIC: No lateralizing deficit.;     PSYCHIATRIC:  appropriate affect and demeanor; normal speech pattern; grossly normal memory;         Procedures:     Impacted cerumen, right ear        Cerumen/Wax removal: Cerumen impaction is noted in the right ear The degree of wax accumulation is moderate in the right ear.  With moderate dificulty, using a syringe irrigation, the wax is removed.  Removed from ear was hard balls of wax.  The patient tolerated the procedure well.      There were no complications.  Performed by: alysa             Assessment:         I10   Essential (primary) hypertension       E78.2   Mixed hyperlipidemia       Z13.31   Encounter for screening for depression       M15.0   Primary generalized (osteo)arthritis       R35.0   Frequency of micturition       Z23   Encounter for immunization       H61.21   Impacted cerumen, right ear       R73.03   Prediabetes           ORDERS:         Meds Prescribed:       [Refilled] amLODIPine 10 mg oral tablet [TAKE ONE TABLET BY MOUTH DAILY], #90 (ninety) tablets, Refills: 1 (one)       [Refilled] atorvastatin 10 mg oral tablet [take 1 tablet (10 mg) by oral route once daily], #90 (ninety) tablets, Refills: 0 (zero)         Lab Orders:       [id number]  DIAB1 - [facility] LIPID,CMP, A1C: 47133, 14486, 30665  (Send-Out)              Procedures Ordered:       [id number]  Fluzone High Dose  (In-House)            [id number]  Removal of impacted cerumen right ear (NURSE)  (In-House)              Other Orders:         Depression screen negative  (In-House)              Administration of influenza virus vaccine  (x1)                  Plan:         Essential (primary) hypertensionHe is doing good with his blood pressures currently.  Continue on current medication          Prescriptions:       [Refilled] amLODIPine 10 mg oral tablet [TAKE ONE  TABLET BY MOUTH DAILY], #90 (ninety) tablets, Refills: 1 (one)         Mixed hyperlipidemiaCheck labs predicate medication changes based on those results          Prescriptions:       [Refilled] atorvastatin 10 mg oral tablet [take 1 tablet (10 mg) by oral route once daily], #90 (ninety) tablets, Refills: 0 (zero)         Encounter for screening for depression    MIPS Negative Depression Screen           Orders:         Depression screen negative  (In-House)              Primary generalized (osteo)arthritisPatient is good to get by with Tylenol for now.  He knows we can undertake a trial of tramadol again in the future if so desires. He says he will try to get a copy of his latest records from the [facility] orthopedic surgeon        Frequency of micturitionHe wants to hold the finasteride for now, but knows that we can put him back on it again if he wants to give it another try.         Encounter for immunization        IMMUNIZATIONS given today: Influenza HIGH Dose.            Immunizations:       96047  Fluzone High Dose  (In-House)                Dose (ml): 0.7  Site: left deltoid  Route: intramuscular  Administered by: Nona Butt          : [employer]  Lot #: [id number]  Exp: 06/30/2021          NDC: [address]-67        Administration of influenza virus vaccine  (x1)          Impacted cerumen, right ear          Orders:       [id number]  Removal of impacted cerumen right ear (NURSE)  (In-House)              Prediabetes    LABORATORY:  Labs ordered to be performed today include Diabetes Panel 1; CMP, Lipid, A1C.            Orders:       [id number]  DIAB - [facility] LIPID,CMP, A1C: 62786, 49727, 97588  (Send-Out)                  Other Patient Education Handouts:     Dragon transcription disclaimer:        Much of this encounter note is an electronic transcription/translation of spoken language to printed text.  The electronic translation of spoken language may permit erroneous, or at times,  nonsensical words or phrases to be inadvertently transcribed.  Although I have reviewed the note for such errors, some may still exist.        Charge Capture:         Primary Diagnosis:     I10  Essential (primary) hypertension           Orders:      [id number]  Office/outpatient visit; established patient, level 4  (In-House)              E78.2  Mixed hyperlipidemia     Z13.31  Encounter for screening for depression           Orders:        Depression screen negative  (In-House)              M15.0  Primary generalized (osteo)arthritis     R35.0  Frequency of micturition     Z23  Encounter for immunization           Orders:      [id number]  Fluzone High Dose  (In-House)              Administration of influenza virus vaccine  (x1)          H61.21  Impacted cerumen, right ear           Orders:      [id number]  Removal of impacted cerumen right ear (NURSE)  (In-House)              R73.03  Prediabetes

## 2021-05-18 NOTE — PROGRESS NOTES
Laron Billy MUIRTiana 1934     Office/Outpatient Visit    Visit Date: Fri, Apr 19, 2019 09:09 am    Provider: Ramon Herrera MD (Assistant: Amanda Boone)    Location: [facility]        Electronically signed by Ramon Herrera MD on  04/19/2019 03:51:38 PM                             SUBJECTIVE:        CC:     Ced is a 85 year old White male.  6 month follow up (PER MED LIST TAKING ATORVASTATIN, DID NOT BRING IN BOTTLE, UNSURE IF TAKING)         HPI:         Ced presents with mixed hyperlipidemia.  Compliance with treatment has been good.  He specifically denies associated symptoms, including muscle pain and weakness.          Dx with hypertension; he did not bring his blood pressure diary, but says that pressures have been okay.  He is tolerating the medication well without side effects.  Compliance with treatment has been good.      He continues to struggle with the issues related to his right knee and osteoarthritis.  He did go to physical therapy and even though they could not do much in regards to increased range of motion of the knee they did build up his shoe which has proven beneficial.  He is still debating about having further surgery.  He has concerns about the cognitive effects of anesthesia which we discussed at length. Says that he is having some pain in the knee that makes it hard for him to sleep at night     ROS:     CONSTITUTIONAL:  Negative for chills, fatigue and fever.      CARDIOVASCULAR:  Negative for chest pain, orthopnea, paroxysmal nocturnal dyspnea and pedal edema.      RESPIRATORY:  Negative for dyspnea and cough.      GASTROINTESTINAL:  Negative for abdominal pain, heartburn, constipation, diarrhea, and stool changes.      GENITOURINARY:  Negative for dysuria and polyuria.      PSYCHIATRIC:  Negative for anxiety and depression.          PMH/FMH/SH:     Last Reviewed on 6/19/2018 10:56 AM by Ramon Herrera    Past Medical History:         Positive for    Peptic Ulcer  Disease: dx'd in 2011; dx by EGD; significant GI Bleeding; ;         CURRENT MEDICAL PROVIDERS:    Orthopedist: dr. escalante    vascular, dr. stephenson         PREVENTIVE HEALTH MAINTENANCE             COLORECTAL CANCER SCREENING:; 2011         Surgical History:         Joint Replacement: knee,right; ,;      Carotid Endarterectomy: bilateral; , 2011;     Removal Giant Nevus- left chest- 3/11;     Procedures:    left eye cataract 2018 FRANCISCO TRIPLETT         Family History:     Father:  at age 40; Cause of death was GSW     Mother: Myocardial Infarction ( 81 yo )     Sister(s): Healthy         Social History:     Occupation: Retired (Prior occupation: ,)         Tobacco/Alcohol/Supplements:     Last Reviewed on 10/19/2018 12:33 PM by Kristen Alberts    Tobacco: He has never smoked.          Alcohol: Frequency: Daily When he drinks, the average quantity of alcohol is 2-3 drinks.          Substance Abuse History:     Last Reviewed on 2016 02:56 PM by Alycia Hou        Mental Health History:     Last Reviewed on 2016 02:56 PM by Alycia Hou        Communicable Diseases (eg STDs):     Last Reviewed on 2016 02:56 PM by Alycia Hou            Allergies:     Last Reviewed on 2017 09:47 AM by Hiral Gutierrez    Meloxicam:        Current Medications:     Last Reviewed on 10/19/2018 12:33 PM by Kristen Alberts    Lisinopril 40mg Tablet Take 1 tablet(s) by mouth daily     Atorvastatin Calcium 20mg Tablet Take 1 tablet(s) by mouth daily     Allopurinol 300mg Tablet Take 1 tablet(s) by mouth daily     Metformin HCl 500mg Tablets, Extended Release Take 1 tablet(s) by mouth daily     Bystolic 10mg Tablet Take 1 tablet(s) by mouth daily     Amlodipine  10mg Tablet Take 1 tablet(s) by mouth daily     Aspirin (ASA) 81mg Tablets, Enteric Coated 1 tab daily     Vitamin C 500mg Chewable Tablet Chew 1 tablet(s) by mouth daily     Vitamin D3 1,000IU Capsules take 1 capsule  daily     Ferrous Sulfate 325mg Tablets 1 TAB DAILY     Lumigan 0.01% Ophthalmic Solution Instill 1 drop(s) to affected eye(s) each evening     Timolol Maleate 0.5% Ophthalmic Solution 1 drop both eye daily         OBJECTIVE:        Vitals:         Current: 4/19/2019 9:13:48 AM    Ht:  5 ft, 8.25 in;  Wt: 187.8 lbs;  BMI: 28.3    T: 97.4 F (oral);  BP: 143/74 mm Hg (right arm, sitting);  P: 57 bpm (right arm (BP Cuff), sitting);  sCr: 0.91 mg/dL;  GFR: 58.30        Repeat:     10:19:20 AM     BP:   140/72mm Hg (left arm, sitting, BY STILES)         Exams:     PHYSICAL EXAM:     GENERAL:  well developed and nourished; appropriately groomed; in no apparent distress;     EYES: Nonicteric and with unremarkable lids, iris and pupils;     E/N/T: EARS:  normal external auditory canals and tympanic membranes;  grossly normal hearing; OROPHARYNX:  normal mucosa, dentition, gingiva, and posterior pharynx;     NECK: carotid exam reveals no bruits;     RESPIRATORY: normal respiratory rate and pattern with no distress; normal breath sounds with no rales, rhonchi, wheezes or rubs;     CARDIOVASCULAR: normal rate; rhythm is regular;  a systolic murmur is noted: it is grade 2/6;     LYMPHATIC: no enlargement of cervical or facial nodes;     MUSCULOSKELETAL: right knee does not extend fully, he walks with cane.; Slight amount of warmth in the right knee     NEUROLOGIC: No lateralizing deficit.;     PSYCHIATRIC:  appropriate affect and demeanor; normal speech pattern; grossly normal memory;         ASSESSMENT           272.2   E78.2  Mixed hyperlipidemia              DDx:     401.1   I10  Hypertension              DDx:     715.09   M15.0  Generalized osteoarthritis, multiple sites              DDx:     715.16   M17.11  Osteoarthritis of knee              DDx:         ORDERS:         Meds Prescribed:       Refill of: Lisinopril 40mg Tablet Take 1 tablet(s) by mouth daily  #30 (Thirty) tablet(s) Refills: 2       Refill of: Atorvastatin  Calcium 20mg Tablet Take 1 tablet(s) by mouth daily  #30 (Thirty) tablet(s) Refills: 2       Refill of: Allopurinol 300mg Tablet Take 1 tablet(s) by mouth daily  #30 (Thirty) tablet(s) Refills: 2       Refill of: Metformin HCl 500mg Tablets, Extended Release Take 1 tablet(s) by mouth daily  #30 (Thirty) tablet(s) Refills: 2       Refill of: Bystolic (Nebivolol) 10mg Tablet Take 1 tablet(s) by mouth daily  #30 (Thirty) tablet(s) Refills: 2       Refill of: Amlodipine  10mg Tablet Take 1 tablet(s) by mouth daily  #30 (Thirty) tablet(s) Refills: 2         Lab Orders:       [id number]  DIAB1 - [facility] LIPID,CMP, A1C: 57165, 11942, 35561  (Send-Out)         APPTO  Appointment need  (In-House)                   PLAN:          Mixed hyperlipidemia     LABORATORY:  Labs ordered to be performed today include Diabetes Panel 1; CMP, Lipid, A1C.            Prescriptions:       Refill of: Lisinopril 40mg Tablet Take 1 tablet(s) by mouth daily  #30 (Thirty) tablet(s) Refills: 2       Refill of: Atorvastatin Calcium 20mg Tablet Take 1 tablet(s) by mouth daily  #30 (Thirty) tablet(s) Refills: 2       Refill of: Allopurinol 300mg Tablet Take 1 tablet(s) by mouth daily  #30 (Thirty) tablet(s) Refills: 2       Refill of: Metformin HCl 500mg Tablets, Extended Release Take 1 tablet(s) by mouth daily  #30 (Thirty) tablet(s) Refills: 2       Refill of: Bystolic (Nebivolol) 10mg Tablet Take 1 tablet(s) by mouth daily  #30 (Thirty) tablet(s) Refills: 2       Refill of: Amlodipine  10mg Tablet Take 1 tablet(s) by mouth daily  #30 (Thirty) tablet(s) Refills: 2           Orders:       [id number]  DIAB1 - [facility] LIPID,CMP, A1C: 12510, 52401, 28088  (Send-Out)            Hypertension         FOLLOW-UP: Schedule a follow-up visit in 6 months..            Orders:       APPTO  Appointment need  (In-House)            Osteoarthritis of knee We will have him try Tylenol 4 times daily and if that is not sufficient for pain relief told him we could discuss other  alternatives including addition of Cymbalta or perhaps tramadol             Patient Recommendations:    Dragon transcription disclaimer:        Much of this encounter note is an electronic transcription/translation of spoken language to printed text.  The electronic translation of spoken language may permit erroneous, or at times, nonsensical words or phrases to be inadvertently transcribed.  Although I have reviewed the note for such errors, some may still exist.         For  Hypertension:     Schedule a follow-up visit in 6 months.                APPOINTMENT INFORMATION:        Monday Tuesday Wednesday Thursday Friday Saturday Sunday            Time:___________________AM  PM   Date:_____________________             CHARGE CAPTURE           **Please note: ICD descriptions below are intended for billing purposes only and may not represent clinical diagnoses**        Primary Diagnosis:         272.2 Mixed hyperlipidemia            E78.2    Mixed hyperlipidemia              Orders:          [id number]   Office/outpatient visit; established patient, level 4  (In-House)           401.1 Hypertension            I10    Essential (primary) hypertension              Orders:          APPTO   Appointment need  (In-House)           715.09 Generalized osteoarthritis, multiple sites            M15.0    Primary generalized (osteo)arthritis    715.16 Osteoarthritis of knee            M17.11    Unilateral primary osteoarthritis, right knee

## 2021-05-18 NOTE — PROGRESS NOTES
Billy Larry  1934     Office/Outpatient Visit    Visit Date: Mon, Mar 2, 2020 02:04 pm    Provider: Ramon Herrera MD (Assistant: Spurling, Sarah C, MA)    Location: [facility]        Electronically signed by Ramon Herrera MD on  03/02/2020 04:17:50 PM                             Subjective:        CC: Ced is a 85 year old White male.  Pt is here today to discuss blood pressure and he said that he sent you a letter and BP readings,         HPI: Mr. Larry fell mid December and suffered a right intertrochanteric hip fracture.  Was transferred from [facility] to [facility] where the surgery was performed.  We do not have records from [facility].  Post discharge from the hospital he went to [facility] nursing [place] and rehab.  He was discharged from [facility] on January 16 and has received home health through [facility] for ongoing physical therapy until they discharged him in the not so distant past.  He reports having continued challenges with ambulation.  And he is also developed a sore on the posterior left heel.  Continues to have considerable pain discomfort and functional limitations related to the right knee as well.  In fact even discussed this with me is consideration of having amputation of the right leg and subsequent prosthesis.  I told him I did not think that would go well since he would have an above the knee amputation due to the poorly functioning right knee.      He does have hypertension which is rather erratic.  His readings at home recently though have been pretty good.  Due to his OCD he checks the pressure very frequently.  He seems satisfied with most of the readings but he did not bring his logbook with him today.  He did bring to representative readings from February 17 and March 1 both of which look good.      He does have a history of some prediabetes issues but his blood sugars continue to do quite well  while he was in the hospital he reports.          With regard to the mixed hyperlipidemia, compliance with treatment has been good.  He specifically denies associated symptoms, including muscle pain and weakness.        I expressed concern about his considerable weight loss but he says that his weight was actually down to 150 pounds while he was in the hospital.  So his weight the day represents an actual weight gain according to him.    ROS:     CONSTITUTIONAL:  Negative for chills, fatigue and fever.      CARDIOVASCULAR:  Negative for chest pain, orthopnea, paroxysmal nocturnal dyspnea and pedal edema.      RESPIRATORY:  Negative for dyspnea and cough.      GASTROINTESTINAL:  Negative for abdominal pain, heartburn, constipation, diarrhea, and stool changes.      GENITOURINARY:  Negative for dysuria and polyuria.      PSYCHIATRIC:  Negative for anxiety and depression.          Past Medical History / Family History / Social History:         Last Reviewed on 3/02/2020 03:11 PM by Ramon Herrera    Past Medical History:         Positive for    Peptic Ulcer Disease: dx'd in 2011; dx by EGD; significant GI Bleeding; ;         CURRENT MEDICAL PROVIDERS:    Orthopedist: dr. escalante    vascular, dr. stephenson         PREVENTIVE HEALTH MAINTENANCE             COLORECTAL CANCER SCREENING:; 2011         Surgical History:         Joint Replacement: knee,right; ,;     Carotid Endarterectomy: bilateral; , 2011;     Removal Giant Nevus- left chest- 3/11;    Right Hip Fx Repair UL- Dec 2019;     Procedures:    left eye cataract 2018 FRANCISCO TRIPLETT         Family History:     Father:  at age 40; Cause of death was GSW     Mother: Myocardial Infarction ( 81 yo )     Sister(s): Healthy         Social History:     Occupation: Retired (Prior occupation: ,)         Tobacco/Alcohol/Supplements:     Last Reviewed on 3/02/2020 02:05 PM by Spurling, Sarah C    Tobacco: He has never smoked.           Alcohol: Frequency: Daily When he drinks, the average quantity of alcohol is 2-3 drinks.          Substance Abuse History:     Last Reviewed on 7/25/2016 02:56 PM by Alycia Hou        Mental Health History:     Last Reviewed on 7/25/2016 02:56 PM by Alycia Hou        Communicable Diseases (eg STDs):     Last Reviewed on 7/25/2016 02:56 PM by Alycia Hou        Current Problems:     Last Reviewed on 3/02/2020 02:04 PM by Spurling, Sarah C    Essential (primary) hypertension    Impaired glucose tolerance (oral)    Primary generalized (osteo)arthritis    Other obsessive-compulsive disorder    Mixed hyperlipidemia    Idiopathic gout, multiple sites    Candidiasis, unspecified    Unilateral primary osteoarthritis, right knee    Cardiac arrhythmia, unspecified    Bradycardia, unspecified    Abnormal weight loss    Encounter for screening for malignant neoplasm of colon    Displaced fracture of base of neck of right femur, initial encounter for closed fracture        Allergies:     Last Reviewed on 3/02/2020 02:04 PM by Spurling, Sarah C    Meloxicam:          Current Medications:     Last Reviewed on 3/02/2020 02:13 PM by Spurling, Sarah C    lisinopril 40 mg oral tablet [TAKE ONE TABLET BY MOUTH DAILY]    aspirin 81 mg oral tablet, delayed release (enteric coated) [1 tab daily]    Amlodipine  10 mg oral tablet [Take 1 tablet(s) by mouth daily]    Timolol Maleate 0.5 % ophthalmic (eye) Drops [1 drop both eye daily]    Lumigan 0.01 % ophthalmic (eye) Drops [Instill 1 drop(s) to affected eye(s) each evening]    Vitamin D3 1,000 unit oral capsule [take 1 capsule daily]    Vitamin C 500 mg oral tablet,chewable [Chew 1 tablet(s) by mouth daily]    atorvastatin 20 mg oral tablet [Take 1 tablet(s) by mouth daily]        Objective:        Vitals:         Current: 3/2/2020 2:16:15 PM    Ht:  5 ft, 8.25 in;  Wt: 161.8 lbs;  BMI: 24.4T: 98.6 F (oral);  BP: 169/94 mm Hg (right arm, sitting);  P: 78 bpm (right arm (BP  Cuff), sitting);  sCr: 0.86 mg/dL;  GFR: 57.90        Exams:     PHYSICAL EXAM:     GENERAL:  well developed and nourished; appropriately groomed; in no apparent distress;     EYES: Nonicteric and with unremarkable lids, iris and pupils; He has poor vision and sensitive eyesight related to photophobia    E/N/T:  normal EACs, TMs, nasal/oral mucosa, teeth, gingiva, and oropharynx; He is very hard of hearing even with his hearing aids in place    NECK: thyroid exam reveals no discrete nodules;  carotid exam reveals no bruits;     RESPIRATORY: normal respiratory rate and pattern with no distress; normal breath sounds with no rales, rhonchi, wheezes or rubs;     CARDIOVASCULAR: normal rate; rhythm is regular;  a systolic murmur is noted: it is grade 2/6;     LYMPHATIC: no enlargement of cervical or facial nodes;     SKIN: He does have an ulcerated area on the left heel (see photo).;     MUSCULOSKELETAL: right knee does not extend fully, he walks with cane.; Is getting around slow, and seems a little unsteady, but was able to ambulate on his own without assistance.    NEUROLOGIC: No lateralizing deficit.;     PSYCHIATRIC:  appropriate affect and demeanor; normal speech pattern; grossly normal memory;         Assessment:         S72.041D   Displaced fracture of base of neck of right femur, subsequent encounter for closed fracture with routine healing       W19.XXXS   Unspecified fall, sequela       I10   Essential (primary) hypertension       R73.02   Impaired glucose tolerance (oral)       M15.0   Primary generalized (osteo)arthritis       F42.8   Other obsessive-compulsive disorder       E78.2   Mixed hyperlipidemia       R63.4   Abnormal weight loss       L89.622   Pressure ulcer of left heel, stage 2           ORDERS:         Meds Prescribed:       [New Rx] silver sulfadiazine 1 % Topical Cream [Wash wound, dry, and then apply cream at daily dressing change. ], #25 (twenty five) grams, Refills: 0 (zero)         Lab  Orders:       03369  [address] [place] [address] LIPID,CMP, A1C: 11517, 10464, 18788  (Send-Out)            72424  CB - [facility] CBC with 3 part diff  (Send-Out)            93017  [facility] TSH  (Send-Out)                      Plan:         Displaced fracture of base of neck of right femur, subsequent encounter for closed fracture with routine healingAll in all Mr. Larry seems to be recovering fairly well from his hip fracture.  He does still have some impaired mobility and could probably benefit from additional physical therapy.We discussed fall precautions and recommend that he get a lifeline type device.He also expresses lots of financial concerns could benefit from assistance from  visit from home health as well.        Unspecified fall, sequelaEncouraged him to get a Lifeline or similar device.  If we get home health back into see him again ask  to help him procure one.In general also suggest that he start thinking about next steps in regards to healthcare needs.  He should consider assigning a healthcare surrogate in discussing care needs going forward.        Impaired glucose tolerance (oral)    LABORATORY:  Labs ordered to be performed today include Diabetes Panel 1; CMP, Lipid, A1C.            Orders:       [id number]  [address] LIPID,CMP, A1C: 90594, 39443, 39011  (Send-Out)              Other obsessive-compulsive disorderHe is not currently receiving treatment for his OCD and not interested in referral or attempted treatment at this time        Mixed hyperlipidemiaCheck labs predicate medication changes based on those results        Abnormal weight lossI am concerned about his weight loss but he reports that he is actually experienced weight gain since discharge from the hospital.  Will send for hospital records see if we can substantiate that.  Go ahead and check labs for now.    LABORATORY:  Labs ordered to be performed today include TSH.            Orders:       10916  [facility] TSH  (Send-Out)               Pressure ulcer of left heel, stage 2Clean the wound and applied Silvadene and sterile dressing.  Gave him instruction to do the same at home at least until we can get home health then with the wound care nurse.He would benefit from use of heel protectors.          Prescriptions:       [New Rx] silver sulfadiazine 1 % Topical Cream [Wash wound, dry, and then apply cream at daily dressing change. ], #25 (twenty five) grams, Refills: 0 (zero)             Other Orders      39670  [facility] with 3 part diff  (Send-Out)              Other Patient Education Handouts:     Dragon transcription disclaimer:        Much of this encounter note is an electronic transcription/translation of spoken language to printed text.  The electronic translation of spoken language may permit erroneous, or at times, nonsensical words or phrases to be inadvertently transcribed.  Although I have reviewed the note for such errors, some may still exist.        Charge Capture:         Primary Diagnosis:     S72.041D  Displaced fracture of base of neck of right femur, subsequent encounter for closed fracture with routine healing           Orders:      29625  Office/outpatient visit; established patient, level 5  (In-House)              W19.XXXS  Unspecified fall, sequela     I10  Essential (primary) hypertension     R73.02  Impaired glucose tolerance (oral)     M15.0  Primary generalized (osteo)arthritis     F42.8  Other obsessive-compulsive disorder     E78.2  Mixed hyperlipidemia     R63.4  Abnormal weight loss     L89.622  Pressure ulcer of left heel, stage 2         ADDENDUMS:      ____________________________________    Addendum: 03/04/2020 03:18 PM - Sally Arellano         Visit Note Faxed to:             Addendum: 03/05/2020 03:54 PM - Ramon Herrera        Office visit was for 75 min, majority of which was counseling regarding his multiple concerns dealing with his previous surgery, continued functional limitations,  development of decubitus and preventive measures/treatment options, importance of foot inspection, fall precautions and alert devices, along with weight loss/nutrition concerns and possible implications.         mas

## 2021-06-14 ENCOUNTER — HOSPITAL ENCOUNTER (EMERGENCY)
Dept: HOSPITAL 49 - FER | Age: 86
Discharge: HOME | End: 2021-06-14
Payer: COMMERCIAL

## 2021-06-14 ENCOUNTER — TELEPHONE (OUTPATIENT)
Dept: FAMILY MEDICINE CLINIC | Age: 86
End: 2021-06-14

## 2021-06-14 DIAGNOSIS — I10: ICD-10-CM

## 2021-06-14 DIAGNOSIS — H61.22: Primary | ICD-10-CM

## 2021-06-14 DIAGNOSIS — Z79.899: ICD-10-CM

## 2021-06-14 NOTE — TELEPHONE ENCOUNTER
He wants to speak with nurse.  He could come in for ear irrigation if he would like. Otherwise, I will we can address on July 7th

## 2021-06-14 NOTE — TELEPHONE ENCOUNTER
Caller: Iam Larry Jr.    Relationship: Self    Best call back number: [phone]    What is the best time to reach you: ANYTIME     Who are you requesting to speak with (clinical staff, provider,  specific staff member): CLINICAL STAFF     Do you know the name of the person who called: IAM LARRY    What was the call regarding: PATIENT IS WANTING TO SPEAK TO A NURSE. PLEASE CALL AND ADVISE. HE STATED THAT HS LEFT EAR IS CLOGGED.     Do you require a callback: YES

## 2021-06-15 NOTE — TELEPHONE ENCOUNTER
Pt states that he went to the ER last night at [facility] because the wax was bothering him so bad.  They cleaned his ear out and he feels better

## 2021-06-23 ENCOUNTER — TELEPHONE (OUTPATIENT)
Dept: FAMILY MEDICINE CLINIC | Age: 86
End: 2021-06-23

## 2021-06-23 NOTE — TELEPHONE ENCOUNTER
Caller: Billy Larry Jr.    Relationship: Self    Best call back number: [phone]   What medication are you requesting: MUSCLE RELAXER         If a prescription is needed, what is your preferred pharmacy and phone number: [facility] - [place], [place] - [address] AT [facility] - [phone] [facility] [phone] FX

## 2021-06-25 ENCOUNTER — TELEPHONE (OUTPATIENT)
Dept: FAMILY MEDICINE CLINIC | Age: 86
End: 2021-06-25

## 2021-06-25 DIAGNOSIS — G89.29 CHRONIC LOW BACK PAIN, UNSPECIFIED BACK PAIN LATERALITY, UNSPECIFIED WHETHER SCIATICA PRESENT: Primary | ICD-10-CM

## 2021-06-25 DIAGNOSIS — M54.50 CHRONIC LOW BACK PAIN, UNSPECIFIED BACK PAIN LATERALITY, UNSPECIFIED WHETHER SCIATICA PRESENT: ICD-10-CM

## 2021-06-25 DIAGNOSIS — G89.29 CHRONIC LOW BACK PAIN, UNSPECIFIED BACK PAIN LATERALITY, UNSPECIFIED WHETHER SCIATICA PRESENT: ICD-10-CM

## 2021-06-25 DIAGNOSIS — M54.50 CHRONIC LOW BACK PAIN, UNSPECIFIED BACK PAIN LATERALITY, UNSPECIFIED WHETHER SCIATICA PRESENT: Primary | ICD-10-CM

## 2021-06-25 RX ORDER — CYCLOBENZAPRINE HCL 10 MG
TABLET ORAL
Qty: 30 TABLET | Refills: 0 | Status: SHIPPED | OUTPATIENT
Start: 2021-06-25 | End: 2021-06-28 | Stop reason: SDUPTHER

## 2021-06-25 RX ORDER — LISINOPRIL 30 MG/1
1 TABLET ORAL DAILY
COMMUNITY
Start: 2021-06-12 | End: 2021-01-01

## 2021-06-25 RX ORDER — TIMOLOL MALEATE 5 MG/ML
SOLUTION/ DROPS OPHTHALMIC
COMMUNITY
Start: 2021-06-16 | End: 2021-07-17

## 2021-06-25 RX ORDER — FERROUS SULFATE 325(65) MG
325 TABLET ORAL
COMMUNITY
End: 2021-01-01

## 2021-06-25 RX ORDER — CYCLOBENZAPRINE HCL 10 MG
1 TABLET ORAL 3 TIMES DAILY PRN
COMMUNITY
Start: 2021-04-06 | End: 2021-06-25 | Stop reason: SDUPTHER

## 2021-06-25 RX ORDER — ATORVASTATIN CALCIUM 10 MG/1
1 TABLET, FILM COATED ORAL NIGHTLY
COMMUNITY
Start: 2021-06-13 | End: 2021-07-07 | Stop reason: SDUPTHER

## 2021-06-25 RX ORDER — TRAMADOL HYDROCHLORIDE 50 MG/1
50 TABLET ORAL NIGHTLY PRN
COMMUNITY
End: 2021-07-17

## 2021-06-25 RX ORDER — AMLODIPINE BESYLATE 10 MG/1
1 TABLET ORAL DAILY
COMMUNITY
Start: 2021-05-22 | End: 2021-07-06

## 2021-06-25 NOTE — TELEPHONE ENCOUNTER
Caller: Billy Larry Jr.    Relationship to patient: Self    Best call back number: [phone]     Patient is needing: PATIENT NEEDS A CALLBACK REGARDING HIS MEDICATION REFILL REQUEST FOR CYCLOBENZAPRINE HCI 10 MG. PATIENT IS VERY FRUSTRATED AND UPSET.

## 2021-06-25 NOTE — TELEPHONE ENCOUNTER
Caller: Billy Larry Jr.    Relationship: Self    Best call back number: [phone]    Medication needed:   Requested Prescriptions      No prescriptions requested or ordered in this encounter     Cyclobenzaprine HCl 10 MG, RX NUMBER #[id number]    When do you need the refill by: 06/25/2021    What additional details did the patient provide when requesting the medication: PATIENT IS IN A LOT OF PAIN FOR FOUR DAYS, HE'S LEFT SIDE NECK MUSCLE IS STUCK. PATIENT IS REQUESTING A CALLBACK WHEN THE PRESCRIPTION IS SEND OVER.     Does the patient have less than a 3 day supply:  [x] Yes  [] No    What is the patient's preferred pharmacy: [facility] [address] [place] [address], [place] - [address] AT [facility] [address] - [phone] [facility] [phone] FX

## 2021-06-28 DIAGNOSIS — M54.50 CHRONIC LOW BACK PAIN, UNSPECIFIED BACK PAIN LATERALITY, UNSPECIFIED WHETHER SCIATICA PRESENT: ICD-10-CM

## 2021-06-28 DIAGNOSIS — G89.29 CHRONIC LOW BACK PAIN, UNSPECIFIED BACK PAIN LATERALITY, UNSPECIFIED WHETHER SCIATICA PRESENT: ICD-10-CM

## 2021-06-28 RX ORDER — CYCLOBENZAPRINE HCL 10 MG
10 TABLET ORAL 3 TIMES DAILY PRN
Qty: 30 TABLET | Refills: 0 | Status: SHIPPED | OUTPATIENT
Start: 2021-06-28 | End: 2021-06-28 | Stop reason: SDUPTHER

## 2021-06-28 RX ORDER — CYCLOBENZAPRINE HCL 10 MG
10 TABLET ORAL 3 TIMES DAILY PRN
Qty: 30 TABLET | Refills: 0 | Status: SHIPPED | OUTPATIENT
Start: 2021-06-28 | End: 2021-01-01

## 2021-07-01 VITALS
HEART RATE: 61 BPM | WEIGHT: 193.5 LBS | SYSTOLIC BLOOD PRESSURE: 114 MMHG | DIASTOLIC BLOOD PRESSURE: 66 MMHG | HEIGHT: 68 IN | BODY MASS INDEX: 29.33 KG/M2 | TEMPERATURE: 98.2 F

## 2021-07-01 VITALS
BODY MASS INDEX: 26.28 KG/M2 | WEIGHT: 173.4 LBS | HEART RATE: 66 BPM | DIASTOLIC BLOOD PRESSURE: 91 MMHG | TEMPERATURE: 97.5 F | SYSTOLIC BLOOD PRESSURE: 152 MMHG | HEIGHT: 68 IN

## 2021-07-01 VITALS
BODY MASS INDEX: 28.64 KG/M2 | DIASTOLIC BLOOD PRESSURE: 71 MMHG | TEMPERATURE: 97.4 F | SYSTOLIC BLOOD PRESSURE: 129 MMHG | WEIGHT: 189 LBS | HEART RATE: 62 BPM | HEIGHT: 68 IN

## 2021-07-01 VITALS
HEART RATE: 57 BPM | SYSTOLIC BLOOD PRESSURE: 140 MMHG | HEIGHT: 68 IN | TEMPERATURE: 97.4 F | WEIGHT: 187.8 LBS | DIASTOLIC BLOOD PRESSURE: 72 MMHG | BODY MASS INDEX: 28.46 KG/M2

## 2021-07-01 VITALS
BODY MASS INDEX: 27.25 KG/M2 | DIASTOLIC BLOOD PRESSURE: 71 MMHG | TEMPERATURE: 98.6 F | SYSTOLIC BLOOD PRESSURE: 147 MMHG | HEIGHT: 68 IN | HEART RATE: 69 BPM | WEIGHT: 179.8 LBS

## 2021-07-02 VITALS
DIASTOLIC BLOOD PRESSURE: 63 MMHG | BODY MASS INDEX: 25.13 KG/M2 | HEIGHT: 68 IN | HEART RATE: 63 BPM | WEIGHT: 165.8 LBS | SYSTOLIC BLOOD PRESSURE: 133 MMHG | TEMPERATURE: 97.8 F

## 2021-07-02 VITALS
TEMPERATURE: 98.1 F | SYSTOLIC BLOOD PRESSURE: 139 MMHG | DIASTOLIC BLOOD PRESSURE: 75 MMHG | BODY MASS INDEX: 26.31 KG/M2 | HEIGHT: 68 IN | HEART RATE: 68 BPM | WEIGHT: 173.6 LBS

## 2021-07-02 VITALS
TEMPERATURE: 97.8 F | BODY MASS INDEX: 24.67 KG/M2 | SYSTOLIC BLOOD PRESSURE: 133 MMHG | HEIGHT: 68 IN | DIASTOLIC BLOOD PRESSURE: 70 MMHG | WEIGHT: 162.8 LBS | HEART RATE: 66 BPM

## 2021-07-02 VITALS
WEIGHT: 161.8 LBS | HEIGHT: 68 IN | SYSTOLIC BLOOD PRESSURE: 169 MMHG | DIASTOLIC BLOOD PRESSURE: 94 MMHG | BODY MASS INDEX: 24.52 KG/M2 | TEMPERATURE: 98.6 F | HEART RATE: 78 BPM

## 2021-07-03 DIAGNOSIS — I10 ESSENTIAL HYPERTENSION: Primary | ICD-10-CM

## 2021-07-06 RX ORDER — AMLODIPINE BESYLATE 10 MG/1
TABLET ORAL
Qty: 30 TABLET | Refills: 0 | Status: SHIPPED | OUTPATIENT
Start: 2021-07-06 | End: 2021-07-07 | Stop reason: SDUPTHER

## 2021-07-06 RX ORDER — LISINOPRIL 30 MG/1
30 TABLET ORAL DAILY
COMMUNITY
End: 2021-07-17 | Stop reason: SDUPTHER

## 2021-07-07 ENCOUNTER — LAB (OUTPATIENT)
Dept: LAB | Facility: HOSPITAL | Age: 86
End: 2021-07-07

## 2021-07-07 ENCOUNTER — OFFICE VISIT (OUTPATIENT)
Dept: FAMILY MEDICINE CLINIC | Age: 86
End: 2021-07-07

## 2021-07-07 VITALS
BODY MASS INDEX: 24.58 KG/M2 | SYSTOLIC BLOOD PRESSURE: 139 MMHG | DIASTOLIC BLOOD PRESSURE: 80 MMHG | TEMPERATURE: 96.9 F | WEIGHT: 162.2 LBS | HEIGHT: 68 IN | HEART RATE: 81 BPM

## 2021-07-07 DIAGNOSIS — I10 ESSENTIAL HYPERTENSION, BENIGN: ICD-10-CM

## 2021-07-07 DIAGNOSIS — E78.2 MIXED HYPERLIPIDEMIA: ICD-10-CM

## 2021-07-07 DIAGNOSIS — Z00.00 MEDICARE ANNUAL WELLNESS VISIT, SUBSEQUENT: Primary | ICD-10-CM

## 2021-07-07 DIAGNOSIS — R30.0 DYSURIA: ICD-10-CM

## 2021-07-07 DIAGNOSIS — R73.03 PRE-DIABETES: ICD-10-CM

## 2021-07-07 DIAGNOSIS — H90.0 CONDUCTIVE HEARING LOSS, BILATERAL: ICD-10-CM

## 2021-07-07 DIAGNOSIS — M15.0 PRIMARY GENERALIZED HYPERTROPHIC OSTEOARTHROSIS: ICD-10-CM

## 2021-07-07 DIAGNOSIS — I10 ESSENTIAL HYPERTENSION: ICD-10-CM

## 2021-07-07 LAB
ALBUMIN SERPL-MCNC: 4.4 G/DL (ref 3.5–5.2)
ALBUMIN/GLOB SERPL: 1.6 G/DL
ALP SERPL-CCNC: 97 U/L (ref 39–117)
ALT SERPL W P-5'-P-CCNC: 18 U/L (ref 1–41)
ANION GAP SERPL CALCULATED.3IONS-SCNC: 11.6 MMOL/L (ref 5–15)
AST SERPL-CCNC: 28 U/L (ref 1–40)
BACTERIA UR QL AUTO: ABNORMAL /HPF
BILIRUB SERPL-MCNC: 1.1 MG/DL (ref 0–1.2)
BILIRUB UR QL STRIP: NEGATIVE
BUN SERPL-MCNC: 14 MG/DL (ref 8–23)
BUN/CREAT SERPL: 16.3 (ref 7–25)
CALCIUM SPEC-SCNC: 9.8 MG/DL (ref 8.6–10.5)
CHLORIDE SERPL-SCNC: 96 MMOL/L (ref 98–107)
CHOLEST SERPL-MCNC: 177 MG/DL (ref 0–200)
CLARITY UR: CLEAR
CO2 SERPL-SCNC: 26.4 MMOL/L (ref 22–29)
COLOR UR: YELLOW
CREAT SERPL-MCNC: 0.86 MG/DL (ref 0.76–1.27)
GFR SERPL CREATININE-BSD FRML MDRD: 84 ML/MIN/1.73
GLOBULIN UR ELPH-MCNC: 2.8 GM/DL
GLUCOSE SERPL-MCNC: 131 MG/DL (ref 65–99)
GLUCOSE UR STRIP-MCNC: NEGATIVE MG/DL
HBA1C MFR BLD: 5.4 % (ref 4.8–5.6)
HDLC SERPL-MCNC: 101 MG/DL (ref 40–60)
HGB UR QL STRIP.AUTO: ABNORMAL
KETONES UR QL STRIP: NEGATIVE
LDLC SERPL CALC-MCNC: 64 MG/DL (ref 0–100)
LDLC/HDLC SERPL: 0.63 {RATIO}
LEUKOCYTE ESTERASE UR QL STRIP.AUTO: NEGATIVE
NITRITE UR QL STRIP: NEGATIVE
PH UR STRIP.AUTO: 6.5 [PH] (ref 5–8)
POTASSIUM SERPL-SCNC: 4.6 MMOL/L (ref 3.5–5.2)
PROT SERPL-MCNC: 7.2 G/DL (ref 6–8.5)
PROT UR QL STRIP: NEGATIVE
RBC # UR: ABNORMAL /HPF
REF LAB TEST METHOD: ABNORMAL
SODIUM SERPL-SCNC: 134 MMOL/L (ref 136–145)
SP GR UR STRIP: 1.02 (ref 1–1.03)
SQUAMOUS #/AREA URNS HPF: ABNORMAL /HPF
TRIGL SERPL-MCNC: 64 MG/DL (ref 0–150)
UROBILINOGEN UR QL STRIP: ABNORMAL
VLDLC SERPL-MCNC: 12 MG/DL (ref 5–40)
WBC UR QL AUTO: ABNORMAL /HPF

## 2021-07-07 PROCEDURE — 81001 URINALYSIS AUTO W/SCOPE: CPT

## 2021-07-07 PROCEDURE — 80053 COMPREHEN METABOLIC PANEL: CPT

## 2021-07-07 PROCEDURE — G0439 PPPS, SUBSEQ VISIT: HCPCS | Performed by: FAMILY MEDICINE

## 2021-07-07 PROCEDURE — 36415 COLL VENOUS BLD VENIPUNCTURE: CPT

## 2021-07-07 PROCEDURE — 83036 HEMOGLOBIN GLYCOSYLATED A1C: CPT

## 2021-07-07 PROCEDURE — 80061 LIPID PANEL: CPT | Performed by: FAMILY MEDICINE

## 2021-07-07 PROCEDURE — 99213 OFFICE O/P EST LOW 20 MIN: CPT | Performed by: FAMILY MEDICINE

## 2021-07-07 RX ORDER — NEBIVOLOL 5 MG/1
5 TABLET ORAL DAILY
COMMUNITY
End: 2021-07-17

## 2021-07-07 RX ORDER — SIMVASTATIN 40 MG
40 TABLET ORAL DAILY
COMMUNITY
End: 2021-07-07 | Stop reason: ALTCHOICE

## 2021-07-07 RX ORDER — METFORMIN HYDROCHLORIDE EXTENDED-RELEASE TABLETS 500 MG/1
500 TABLET, FILM COATED, EXTENDED RELEASE ORAL DAILY
COMMUNITY
End: 2021-07-17

## 2021-07-07 RX ORDER — AMLODIPINE BESYLATE 10 MG/1
10 TABLET ORAL DAILY
Qty: 90 TABLET | Refills: 0 | Status: SHIPPED | OUTPATIENT
Start: 2021-07-07 | End: 2021-01-01

## 2021-07-07 RX ORDER — HYDROCODONE BITARTRATE AND ACETAMINOPHEN 5; 325 MG/1; MG/1
1 TABLET ORAL
COMMUNITY
End: 2021-07-17

## 2021-07-07 RX ORDER — ATORVASTATIN CALCIUM 10 MG/1
10 TABLET, FILM COATED ORAL NIGHTLY
Qty: 90 TABLET | Refills: 0 | Status: SHIPPED | OUTPATIENT
Start: 2021-07-07 | End: 2021-01-01 | Stop reason: SDUPTHER

## 2021-07-07 RX ORDER — BIMATOPROST 0.01 %
DROPS OPHTHALMIC (EYE)
COMMUNITY
Start: 2021-06-19 | End: 2021-07-17

## 2021-07-07 NOTE — PROGRESS NOTES
The ABCs of the Annual Wellness Visit  Subsequent Medicare Wellness Visit    Chief Complaint   Patient presents with   • Hypertension   • Pain   • Hyperlipidemia   • Med Refill       Subjective   History of Present Illness:  Billy Larry is a 87 y.o. male who presents for a Subsequent Medicare Wellness Visit.    Also here for follow-up on his chronic health conditions.  Continues to struggle some with osteoarthritis in the effects of previous knee surgery which left him with chronic contracture to the right knee.    He has not taken the covid vaccine.  We discussed at length and I tried to address his concerns.  I told him that as his healthcare provider I certainly would recommend that he take the vaccine.      HEALTH RISK ASSESSMENT    Recent Hospitalizations:  No hospitalization(s) within the last year.    Current Medical Providers:  Patient Care Team:  Ramon Herrera MD as PCP - General (Family Medicine)    Smoking Status:  Social History     Tobacco Use   Smoking Status Former Smoker   • Types: Pipe   • Start date:    • Quit date:    • Years since quittin.5   Smokeless Tobacco Never Used       Alcohol Consumption:  Social History     Substance and Sexual Activity   Alcohol Use Yes    Comment: Frequency: Daily when he drinks, the average quantity of alcohol is 2-3 drinks.       Depression Screen:   PHQ-2/PHQ-9 Depression Screening 2021   Little interest or pleasure in doing things 0   Feeling down, depressed, or hopeless 1   Total Score 1       Fall Risk Screen:  STEADI Fall Risk Assessment has not been completed.    Health Habits and Functional and Cognitive Screening:  No flowsheet data found.      Does the patient have evidence of cognitive impairment? No    Asprin use counseling:Does not need ASA (and currently is not on it)    Age-appropriate Screening Schedule:  Refer to the list below for future screening recommendations based on patient's age, sex and/or medical conditions.  Orders for these recommended tests are listed in the plan section. The patient has been provided with a written plan.    Health Maintenance   Topic Date Due   • TDAP/TD VACCINES (1 - Tdap) Never done   • ZOSTER VACCINE (1 of 2) Never done   • INFLUENZA VACCINE  08/01/2021   • LIPID PANEL  07/07/2022          The following portions of the patient's history were reviewed and updated as appropriate: allergies, current medications, past family history, past medical history, past social history, past surgical history and problem list.    Outpatient Medications Prior to Visit   Medication Sig Dispense Refill   • aspirin 81 MG tablet Take 81 mg by mouth Daily.     • cyclobenzaprine (FLEXERIL) 10 MG tablet Take 1 tablet by mouth 3 (Three) Times a Day As Needed for Muscle Spasms. 30 tablet 0   • lisinopril (PRINIVIL,ZESTRIL) 30 MG tablet Take 1 tablet by mouth Daily.     • amLODIPine (NORVASC) 10 MG tablet TAKE ONE TABLET BY MOUTH DAILY 30 tablet 0   • atorvastatin (LIPITOR) 10 MG tablet Take 1 tablet by mouth Every Night.     • ferrous sulfate 325 (65 FE) MG tablet Take 325 mg by mouth Daily With Breakfast.     • HYDROcodone-acetaminophen (NORCO) 5-325 MG per tablet Take 1 tablet by mouth.     • lisinopril (PRINIVIL,ZESTRIL) 30 MG tablet Take 30 mg by mouth Daily.     • Lumigan 0.01 % ophthalmic drops      • metFORMIN (FORTAMET) 500 MG (OSM) 24 hr tablet Take 500 mg by mouth Daily.     • nebivolol (BYSTOLIC) 5 MG tablet Take 5 mg by mouth Daily.     • simvastatin (ZOCOR) 40 MG tablet Take 40 mg by mouth Daily.     • timolol (TIMOPTIC) 0.5 % ophthalmic solution      • traMADol (ULTRAM) 50 MG tablet Take 50 mg by mouth At Night As Needed.       No facility-administered medications prior to visit.       Patient Active Problem List   Diagnosis   • Primary generalized hypertrophic osteoarthrosis   • Mixed hyperlipidemia   • Essential hypertension, benign   • Pre-diabetes   • Conductive hearing loss, bilateral   • Medicare annual  "wellness visit, subsequent   • Dysuria       Advanced Care Planning:  ACP discussion was held with the patient during this visit. Patient has an advance directive (not in EMR), copy requested.    Review of Systems   Constitutional: Negative for chills, fatigue, fever and unexpected weight change.   Respiratory: Negative for cough and shortness of breath.    Cardiovascular: Negative for chest pain.   Gastrointestinal: Negative for abdominal pain, constipation, diarrhea, nausea and vomiting.   Genitourinary: Positive for dysuria. Negative for frequency and hematuria.   Musculoskeletal: Negative for arthralgias and myalgias.   Psychiatric/Behavioral: Negative for sleep disturbance. The patient is not nervous/anxious.        Compared to one year ago, the patient feels his physical health is the same.  Compared to one year ago, the patient feels his mental health is the same.    Reviewed chart for potential of high risk medication in the elderly: yes  Reviewed chart for potential of harmful drug interactions in the elderly:yes    Objective         Vitals:    07/07/21 1023   BP: 139/80   Pulse: 81   Temp: 96.9 °F (36.1 °C)   TempSrc: Oral   Weight: 73.6 kg (162 lb 3.2 oz)   Height: 173.4 cm (68.27\")   PainSc:   6       Body mass index is 24.47 kg/m².  Discussed the patient's BMI with him. The BMI is in the acceptable range.    Physical Exam  Constitutional:       Appearance: Normal appearance.   HENT:      Ears:      Comments: Very hard of hearing  Neck:      Vascular: No carotid bruit.   Cardiovascular:      Rate and Rhythm: Normal rate and regular rhythm.      Heart sounds: Murmur heard.     Pulmonary:      Effort: No respiratory distress.      Breath sounds: No wheezing or rales.   Musculoskeletal:      Right lower leg: No edema.      Left lower leg: No edema.      Comments: Right knee has limited extension, globular deformity   Lymphadenopathy:      Cervical: No cervical adenopathy.   Neurological:      General: No " focal deficit present.      Mental Status: He is alert.   Psychiatric:         Attention and Perception: Attention normal.         Mood and Affect: Mood normal.         Speech: Speech normal.         Lab Results   Component Value Date    TRIG 64 07/07/2021     (H) 07/07/2021    LDL 64 07/07/2021    VLDL 12 07/07/2021    HGBA1C 5.40 07/07/2021        Assessment/Plan   Medicare Risks and Personalized Health Plan  CMS Preventative Services Quick Reference  Hearing Problem    The above risks/problems have been discussed with the patient.  Pertinent information has been shared with the patient in the After Visit Summary.  Follow up plans and orders are seen below in the Assessment/Plan Section.    Diagnoses and all orders for this visit:    1. Medicare annual wellness visit, subsequent (Primary)  Assessment & Plan:  I did suggest he get a lifeline type device.       2. Mixed hyperlipidemia  Assessment & Plan:  No longer on treatment.  We will check lab and predicate changes based on results    Orders:  -     Comprehensive Metabolic Panel; Future  -     Lipid Panel  -     atorvastatin (LIPITOR) 10 MG tablet; Take 1 tablet by mouth Every Night.  Dispense: 90 tablet; Refill: 0    3. Essential hypertension, benign  -     Comprehensive Metabolic Panel; Future  -     amLODIPine (NORVASC) 10 MG tablet; Take 1 tablet by mouth Daily.  Dispense: 90 tablet; Refill: 0    4. Pre-diabetes  Assessment & Plan:  He has done really well with his diet.  We will check lab to see if any medication adjustments need to be made    Orders:  -     Hemoglobin A1c; Future    5. Primary generalized hypertrophic osteoarthrosis  Assessment & Plan:  I think he has resigned himself to not pursue a repeat operation.  Did recommend that he get a lifeline type device.      6. Conductive hearing loss, bilateral    7. Essential hypertension  -     amLODIPine (NORVASC) 10 MG tablet; Take 1 tablet by mouth Daily.  Dispense: 90 tablet; Refill: 0    8.  Dysuria  -     Urinalysis With Microscopic - Urine, Clean Catch; Future    Follow Up:  No follow-ups on file.     An After Visit Summary and PPPS were given to the patient.

## 2021-07-15 ENCOUNTER — TELEPHONE (OUTPATIENT)
Dept: FAMILY MEDICINE CLINIC | Age: 86
End: 2021-07-15

## 2021-07-15 NOTE — TELEPHONE ENCOUNTER
Hub to read    Billy called and was wanting to speak with Bettina Kirk. He states he has questions regarding a report he has received in the mail. He would like a call back at [phone].

## 2021-07-17 NOTE — ASSESSMENT & PLAN NOTE
He has done really well with his diet.  We will check lab to see if any medication adjustments need to be made

## 2021-07-17 NOTE — ASSESSMENT & PLAN NOTE
I think he has resigned himself to not pursue a repeat operation.  Did recommend that he get a lifeline type device.

## 2021-09-15 NOTE — TELEPHONE ENCOUNTER
Caller: Billy Larry Jr.    Relationship: Self    Best call back number: [phone]    What medication are you requesting: SOMETHING FOR PAIN    What are your current symptoms: RIGHT LEG AND KNEE PAIN    How long have you been experiencing symptoms: STARTING TO HAVE PAIN IN RIGHT KNEE CAP    Have you had these symptoms before:    [x] Yes  [] No    If a prescription is needed, what is your preferred pharmacy and phone number:         [facility] - [place], [place] - [address]  - [phone]  - [phone]   [phone]    Additional notes: PATIENT STATES THAT THIS RIGHT LEG IS THE ONE THAT HAD THE BOTCHED OPERATION

## 2021-09-16 NOTE — TELEPHONE ENCOUNTER
He should try Tylenol 500 mg 4 times a day (at each meal and at bedtime).  Give it about a week and if symptoms persist get back in touch    mas

## 2021-10-25 PROBLEM — T84.012A FAILED TOTAL RIGHT KNEE REPLACEMENT: Status: ACTIVE | Noted: 2021-01-01

## 2021-10-25 PROBLEM — M25.561 CHRONIC PAIN OF RIGHT KNEE: Status: ACTIVE | Noted: 2021-01-01

## 2021-10-25 PROBLEM — G89.29 CHRONIC PAIN OF RIGHT KNEE: Status: ACTIVE | Noted: 2021-01-01

## 2021-10-25 NOTE — PROGRESS NOTES
"Chief Complaint  Hyperlipidemia and Hypertension    Subjective          Billy Larry presents to [facility]  History of Present Illness    Mr. Larry is here today follow-up on his chronic health issues.  Pain in the right knee has a grown in intensity.  Says he has not been able to sleep for the past several days due to the severity of the pain.  Been taking Tylenol on a scheduled basis but just not effective.  We reviewed previous notes from Ortho 2013 and 2018 and 2020.  He states he really is not interested in pursuing surgery.  He would rather try to get relief with medication.  Given the experience has had with previous failed surgery I understand his reluctance.  As far as his hypertension is tolerating his medications okay.  He checks his blood pressure frequently and in general says it is doing okay.  Does have history hyperlipidemia and prediabetes.  His labs have looked very good.  Tolerating his Lipitor without difficulty.  He is difficult to communicate with because of his severe hearing loss.  Ended up writing notes to him.    Review of Systems   Constitutional: Negative for chills, fatigue and fever.   Respiratory: Negative for chest tightness, shortness of breath and wheezing.    Cardiovascular: Negative for chest pain and palpitations.   Gastrointestinal: Negative for constipation, diarrhea, nausea and vomiting.   Genitourinary: Negative for dysuria, hematuria and urgency.   Neurological: Negative for weakness and headache.   Psychiatric/Behavioral: Negative for hallucinations.        Objective   Vital Signs:   /74   Pulse 67   Temp 97.7 °F (36.5 °C) (Oral)   Ht 173.4 cm (68.27\")   Wt 74.1 kg (163 lb 6.4 oz)   BMI 24.65 kg/m²     Physical Exam  Constitutional:       Appearance: Normal appearance.   HENT:      Ears:      Comments: Very hard of hearing even with hearing aids in place  Eyes:      General: No scleral icterus.     Conjunctiva/sclera: " Conjunctivae normal.   Neck:      Vascular: No carotid bruit.   Cardiovascular:      Rate and Rhythm: Normal rate and regular rhythm.      Heart sounds: Murmur heard.    Systolic murmur is present with a grade of 2/6.      Pulmonary:      Effort: No respiratory distress.      Breath sounds: No wheezing or rales.   Musculoskeletal:      Right lower leg: No edema.      Left lower leg: No edema.      Comments: The right knee has limited extension: Chronically flexed about 20 degrees   Lymphadenopathy:      Cervical: No cervical adenopathy.   Neurological:      General: No focal deficit present.      Mental Status: He is alert.   Psychiatric:         Attention and Perception: Attention normal.         Mood and Affect: Mood normal.         Speech: Speech normal.            Result Review :   The following data was reviewed by: Ramon Herrera MD on 10/25/2021:  Urinalysis With Microscopic - Urine, Clean Catch (07/07/2021 12:00)  Comprehensive Metabolic Panel (07/07/2021 12:00)  Hemoglobin A1c (07/07/2021 12:00)  Lipid Panel (07/07/2021 12:00)                  Assessment and Plan    Diagnoses and all orders for this visit:    1. Primary generalized hypertrophic osteoarthrosis (Primary)  Comments:  With the severity of his pain and sleep interruption in order to give him a trial of tramadol.  Orders:  -     traMADol (ULTRAM) 50 MG tablet; Take 1 tablet by mouth 3 (Three) Times a Day. For pain.  Dispense: 90 tablet; Refill: 1    2. Chronic pain of right knee  Comments:  With the severity of his pain and sleep interruption in order to give him a trial of tramadol.  Orders:  -     traMADol (ULTRAM) 50 MG tablet; Take 1 tablet by mouth 3 (Three) Times a Day. For pain.  Dispense: 90 tablet; Refill: 1  -     POC Urine Drug Screen [facility]    3. Failed total right knee replacement, subsequent encounter  Comments:  With the severity of his pain and sleep interruption in order to give him a trial of tramadol.  He is not  interested in seeing orthopedics at this time  Orders:  -     traMADol (ULTRAM) 50 MG tablet; Take 1 tablet by mouth 3 (Three) Times a Day. For pain.  Dispense: 90 tablet; Refill: 1    4. Conductive hearing loss, bilateral  Comments:  Certainly a limiting condition for him    5. Mixed hyperlipidemia  Comments:  We will check labs predicate medication change based on result.  Traditionally does quite good  Orders:  -     Lipid Panel  -     Comprehensive Metabolic Panel; Future    6. Essential hypertension, benign  Comments:  Blood pressure looks okay continue on current regimen  Orders:  -     Comprehensive Metabolic Panel; Future    7. Pre-diabetes  Comments:  We will check lab predicate medication changes based on results  Orders:  -     Hemoglobin A1c; Future  -     Comprehensive Metabolic Panel; Future    8. Long-term use of high-risk medication  Comments:  Will check [place] database and then begin tramadol  Orders:  -     POC Urine Drug Screen [facility]        Follow Up   No follow-ups on file.  Patient was given instructions and counseling regarding his condition or for health maintenance advice. Please see specific information pulled into the AVS if appropriate.

## 2021-10-26 NOTE — TELEPHONE ENCOUNTER
Caller: Billy Larry Jr.    Relationship: Self      Medication requested (name and dosage): THERE IS A NEW SCRIPT AS WELL FOR POST LEG SURGERY PAIN.    Requested Prescriptions:   Requested Prescriptions     Pending Prescriptions Disp Refills   • traMADol (ULTRAM) 50 MG tablet 90 tablet 1     Sig: Take 1 tablet by mouth 3 (Three) Times a Day. For pain.   • atorvastatin (LIPITOR) 10 MG tablet 90 tablet 0     Sig: Take 1 tablet by mouth Every Night.        Pharmacy where request should be sent: [facility] [address] [place] [address] - [facility]  - [phone]  - [phone]   [phone]    Additional details provided by patient: PATIENT IS COMPLETELY OUT AND UPSET THAT MEDICATIONS WERE NOT CALLED IN FROM YESTERDAY'S VISIT.     Best call back number: [phone]    Does the patient have less than a 3 day supply:  [x] Yes  [] No    Luis M Munson Rep   10/26/21 14:33 EDT

## 2021-10-28 NOTE — TELEPHONE ENCOUNTER
Caller: Billy Larry Jr.    Relationship: Self    Best call back number: [phone]    What is the best time to reach you: ANY    Who are you requesting to speak with (clinical staff, provider,  specific staff member): CLINICAL    What was the call regarding: PATIENT PICKED UP HIS TRAMADOL AND READ THE WARNING SIGNS. HE IS NOT COMFORTABLE TAKING THE MEDICATION AT THIS TIME. HE SAID HE IS GOING TO ALTERNATE IBUPROFEN AND TYLENOL FOR THE TIME BEING AND IF THE PAIN GETS TOO BAD THEN HE WILL TAKE TRAMADOL BUT SPARINGLY.     Do you require a callback: IF NEEDED

## 2022-01-01 ENCOUNTER — OFFICE VISIT (OUTPATIENT)
Dept: FAMILY MEDICINE CLINIC | Age: 87
End: 2022-01-01

## 2022-01-01 ENCOUNTER — PATIENT OUTREACH (OUTPATIENT)
Dept: CASE MANAGEMENT | Facility: OTHER | Age: 87
End: 2022-01-01

## 2022-01-01 ENCOUNTER — LAB REQUISITION (OUTPATIENT)
Dept: LAB | Facility: HOSPITAL | Age: 87
End: 2022-01-01

## 2022-01-01 ENCOUNTER — OUTSIDE FACILITY SERVICE (OUTPATIENT)
Dept: FAMILY MEDICINE CLINIC | Age: 87
End: 2022-01-01

## 2022-01-01 ENCOUNTER — TELEPHONE (OUTPATIENT)
Dept: FAMILY MEDICINE CLINIC | Age: 87
End: 2022-01-01

## 2022-01-01 ENCOUNTER — TELEPHONE (OUTPATIENT)
Dept: CASE MANAGEMENT | Facility: OTHER | Age: 87
End: 2022-01-01

## 2022-01-01 ENCOUNTER — REFERRAL TRIAGE (OUTPATIENT)
Dept: CASE MANAGEMENT | Facility: OTHER | Age: 87
End: 2022-01-01

## 2022-01-01 ENCOUNTER — LAB (OUTPATIENT)
Dept: LAB | Facility: HOSPITAL | Age: 87
End: 2022-01-01

## 2022-01-01 VITALS
DIASTOLIC BLOOD PRESSURE: 53 MMHG | OXYGEN SATURATION: 98 % | BODY MASS INDEX: 24.55 KG/M2 | SYSTOLIC BLOOD PRESSURE: 90 MMHG | WEIGHT: 162 LBS | HEIGHT: 68 IN | HEART RATE: 54 BPM

## 2022-01-01 VITALS
SYSTOLIC BLOOD PRESSURE: 114 MMHG | TEMPERATURE: 98.7 F | HEIGHT: 68 IN | WEIGHT: 161 LBS | BODY MASS INDEX: 24.4 KG/M2 | HEART RATE: 65 BPM | DIASTOLIC BLOOD PRESSURE: 58 MMHG

## 2022-01-01 VITALS
HEIGHT: 68 IN | BODY MASS INDEX: 24.44 KG/M2 | HEART RATE: 58 BPM | SYSTOLIC BLOOD PRESSURE: 108 MMHG | DIASTOLIC BLOOD PRESSURE: 55 MMHG

## 2022-01-01 VITALS
SYSTOLIC BLOOD PRESSURE: 58 MMHG | HEIGHT: 68 IN | HEART RATE: 161 BPM | DIASTOLIC BLOOD PRESSURE: 35 MMHG | TEMPERATURE: 98.1 F | BODY MASS INDEX: 24.44 KG/M2

## 2022-01-01 DIAGNOSIS — R55 SYNCOPE, UNSPECIFIED SYNCOPE TYPE: ICD-10-CM

## 2022-01-01 DIAGNOSIS — R26.9 GAIT DISTURBANCE: ICD-10-CM
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
## 2022-01-01 DIAGNOSIS — E78.2 MIXED HYPERLIPIDEMIA: ICD-10-CM

## 2022-01-01 DIAGNOSIS — M15.0 PRIMARY GENERALIZED HYPERTROPHIC OSTEOARTHROSIS: ICD-10-CM

## 2022-01-01 DIAGNOSIS — I95.9 HYPOTENSION, UNSPECIFIED HYPOTENSION TYPE: ICD-10-CM

## 2022-01-01 DIAGNOSIS — F10.20 ALCOHOLISM: Primary | ICD-10-CM

## 2022-01-01 DIAGNOSIS — T84.012D FAILED TOTAL RIGHT KNEE REPLACEMENT, SUBSEQUENT ENCOUNTER: ICD-10-CM

## 2022-01-01 DIAGNOSIS — F10.20 ALCOHOLISM: ICD-10-CM
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
## 2022-01-01 DIAGNOSIS — M25.561 CHRONIC PAIN OF RIGHT KNEE: ICD-10-CM

## 2022-01-01 DIAGNOSIS — I10 ESSENTIAL HYPERTENSION, BENIGN: Primary | ICD-10-CM

## 2022-01-01 DIAGNOSIS — N39.0 URINARY TRACT INFECTION, SITE NOT SPECIFIED: ICD-10-CM

## 2022-01-01 DIAGNOSIS — R30.0 DYSURIA: ICD-10-CM

## 2022-01-01 DIAGNOSIS — I95.2 HYPOTENSION DUE TO DRUGS: ICD-10-CM

## 2022-01-01 DIAGNOSIS — R63.4 WEIGHT LOSS: ICD-10-CM

## 2022-01-01 DIAGNOSIS — I10 ESSENTIAL HYPERTENSION, BENIGN: ICD-10-CM

## 2022-01-01 DIAGNOSIS — R79.89 HIGH THYROID STIMULATING HORMONE (TSH) LEVEL: Primary | ICD-10-CM

## 2022-01-01 DIAGNOSIS — R26.9 GAIT DISTURBANCE: Primary | ICD-10-CM

## 2022-01-01 DIAGNOSIS — H61.20 CERUMEN IN AUDITORY CANAL ON EXAMINATION: Primary | ICD-10-CM
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
## 2022-01-01 DIAGNOSIS — F10.20 ANEMIA DUE TO ALCOHOLISM: ICD-10-CM

## 2022-01-01 DIAGNOSIS — G89.29 CHRONIC PAIN OF RIGHT KNEE: ICD-10-CM

## 2022-01-01 DIAGNOSIS — R73.03 PRE-DIABETES: Primary | ICD-10-CM

## 2022-01-01 DIAGNOSIS — F10.20 ALCOHOL DEPENDENCE, UNCOMPLICATED: ICD-10-CM

## 2022-01-01 DIAGNOSIS — H90.0 CONDUCTIVE HEARING LOSS, BILATERAL: ICD-10-CM

## 2022-01-01 DIAGNOSIS — E86.0 DEHYDRATION: ICD-10-CM

## 2022-01-01 DIAGNOSIS — Z23 NEED FOR VACCINATION: ICD-10-CM

## 2022-01-01 DIAGNOSIS — R53.1 WEAKNESS: ICD-10-CM

## 2022-01-01 DIAGNOSIS — L89.612 PRESSURE ULCER OF RIGHT HEEL, STAGE 2: ICD-10-CM

## 2022-01-01 DIAGNOSIS — D64.9 ANEMIA, UNSPECIFIED TYPE: Primary | ICD-10-CM

## 2022-01-01 DIAGNOSIS — I10 ESSENTIAL HYPERTENSION: ICD-10-CM

## 2022-01-01 DIAGNOSIS — D64.89 ANEMIA DUE TO ALCOHOLISM: ICD-10-CM

## 2022-01-01 DIAGNOSIS — R73.03 PRE-DIABETES: ICD-10-CM

## 2022-01-01 DIAGNOSIS — I49.9 IRREGULAR HEART RHYTHM: ICD-10-CM

## 2022-01-01 DIAGNOSIS — Z12.5 SCREENING FOR PROSTATE CANCER: ICD-10-CM

## 2022-01-01 DIAGNOSIS — R00.1 BRADYCARDIA: ICD-10-CM

## 2022-01-01 DIAGNOSIS — R63.4 ABNORMAL WEIGHT LOSS: ICD-10-CM

## 2022-01-01 DIAGNOSIS — R79.89 HIGH THYROID STIMULATING HORMONE (TSH) LEVEL: ICD-10-CM

## 2022-01-01 LAB
ALBUMIN SERPL-MCNC: 3.3 G/DL (ref 3.5–5.2)
ALBUMIN SERPL-MCNC: 4.1 G/DL (ref 3.5–5.2)
ALBUMIN/GLOB SERPL: 1 G/DL
ALBUMIN/GLOB SERPL: 1.5 G/DL
ALP SERPL-CCNC: 101 U/L (ref 39–117)
ALP SERPL-CCNC: 117 U/L (ref 39–117)
ALT SERPL W P-5'-P-CCNC: 12 U/L (ref 1–41)
ALT SERPL W P-5'-P-CCNC: 33 U/L (ref 1–41)
ANION GAP SERPL CALCULATED.3IONS-SCNC: 10.7 MMOL/L (ref 5–15)
ANION GAP SERPL CALCULATED.3IONS-SCNC: 11.6 MMOL/L (ref 5–15)
AST SERPL-CCNC: 25 U/L (ref 1–40)
AST SERPL-CCNC: 38 U/L (ref 1–40)
BACTERIA SPEC AEROBE CULT: NORMAL
BACTERIA UR QL AUTO: ABNORMAL /HPF
BASOPHILS # BLD AUTO: 0.03 10*3/MM3 (ref 0–0.2)
BASOPHILS # BLD AUTO: 0.05 10*3/MM3 (ref 0–0.2)
BASOPHILS NFR BLD AUTO: 0.4 % (ref 0–1.5)
BASOPHILS NFR BLD AUTO: 0.7 % (ref 0–1.5)
BILIRUB SERPL-MCNC: 0.6 MG/DL (ref 0–1.2)
BILIRUB SERPL-MCNC: 0.6 MG/DL (ref 0–1.2)
BILIRUB UR QL STRIP: NEGATIVE
BUN SERPL-MCNC: 16 MG/DL (ref 8–23)
BUN SERPL-MCNC: 18 MG/DL (ref 8–23)
BUN/CREAT SERPL: 19.3 (ref 7–25)
BUN/CREAT SERPL: 20 (ref 7–25)
CALCIUM SPEC-SCNC: 9.6 MG/DL (ref 8.6–10.5)
CALCIUM SPEC-SCNC: 9.8 MG/DL (ref 8.6–10.5)
CHLORIDE SERPL-SCNC: 103 MMOL/L (ref 98–107)
CHLORIDE SERPL-SCNC: 98 MMOL/L (ref 98–107)
CHOLEST SERPL-MCNC: 166 MG/DL (ref 0–200)
CLARITY UR: CLEAR
CO2 SERPL-SCNC: 23.3 MMOL/L (ref 22–29)
CO2 SERPL-SCNC: 23.4 MMOL/L (ref 22–29)
COLOR UR: YELLOW
CREAT SERPL-MCNC: 0.83 MG/DL (ref 0.76–1.27)
CREAT SERPL-MCNC: 0.9 MG/DL (ref 0.76–1.27)
DEPRECATED RDW RBC AUTO: 49.9 FL (ref 37–54)
DEPRECATED RDW RBC AUTO: 50 FL (ref 37–54)
EGFRCR SERPLBLD CKD-EPI 2021: 82.1 ML/MIN/1.73
EGFRCR SERPLBLD CKD-EPI 2021: 84.2 ML/MIN/1.73
EOSINOPHIL # BLD AUTO: 0.07 10*3/MM3 (ref 0–0.4)
EOSINOPHIL # BLD AUTO: 0.19 10*3/MM3 (ref 0–0.4)
EOSINOPHIL NFR BLD AUTO: 0.9 % (ref 0.3–6.2)
EOSINOPHIL NFR BLD AUTO: 2.7 % (ref 0.3–6.2)
ERYTHROCYTE [DISTWIDTH] IN BLOOD BY AUTOMATED COUNT: 13.8 % (ref 12.3–15.4)
ERYTHROCYTE [DISTWIDTH] IN BLOOD BY AUTOMATED COUNT: 14.4 % (ref 12.3–15.4)
GLOBULIN UR ELPH-MCNC: 2.7 GM/DL
GLOBULIN UR ELPH-MCNC: 3.2 GM/DL
GLUCOSE SERPL-MCNC: 117 MG/DL (ref 65–99)
GLUCOSE SERPL-MCNC: 70 MG/DL (ref 65–99)
GLUCOSE UR STRIP-MCNC: NEGATIVE MG/DL
HBA1C MFR BLD: 6 % (ref 4.8–5.6)
HCT VFR BLD AUTO: 39.8 % (ref 37.5–51)
HCT VFR BLD AUTO: 46.4 % (ref 37.5–51)
HDLC SERPL-MCNC: 105 MG/DL (ref 40–60)
HGB BLD-MCNC: 12.9 G/DL (ref 13–17.7)
HGB BLD-MCNC: 15.1 G/DL (ref 13–17.7)
HGB UR QL STRIP.AUTO: NEGATIVE
HYALINE CASTS UR QL AUTO: ABNORMAL /LPF
IMM GRANULOCYTES # BLD AUTO: 0.01 10*3/MM3 (ref 0–0.05)
IMM GRANULOCYTES # BLD AUTO: 0.03 10*3/MM3 (ref 0–0.05)
IMM GRANULOCYTES NFR BLD AUTO: 0.1 % (ref 0–0.5)
IMM GRANULOCYTES NFR BLD AUTO: 0.4 % (ref 0–0.5)
KETONES UR QL STRIP: NEGATIVE
LDLC SERPL CALC-MCNC: 51 MG/DL (ref 0–100)
LDLC/HDLC SERPL: 0.49 {RATIO}
LEUKOCYTE ESTERASE UR QL STRIP.AUTO: NEGATIVE
LYMPHOCYTES # BLD AUTO: 0.96 10*3/MM3 (ref 0.7–3.1)
LYMPHOCYTES # BLD AUTO: 1.45 10*3/MM3 (ref 0.7–3.1)
LYMPHOCYTES NFR BLD AUTO: 12.7 % (ref 19.6–45.3)
LYMPHOCYTES NFR BLD AUTO: 20.3 % (ref 19.6–45.3)
MAXIMAL PREDICTED HEART RATE: 132 BPM
MCH RBC QN AUTO: 30.6 PG (ref 26.6–33)
MCH RBC QN AUTO: 31.5 PG (ref 26.6–33)
MCHC RBC AUTO-ENTMCNC: 32.4 G/DL (ref 31.5–35.7)
MCHC RBC AUTO-ENTMCNC: 32.5 G/DL (ref 31.5–35.7)
MCV RBC AUTO: 94.5 FL (ref 79–97)
MCV RBC AUTO: 96.9 FL (ref 79–97)
MONOCYTES # BLD AUTO: 0.65 10*3/MM3 (ref 0.1–0.9)
MONOCYTES # BLD AUTO: 0.71 10*3/MM3 (ref 0.1–0.9)
MONOCYTES NFR BLD AUTO: 10 % (ref 5–12)
MONOCYTES NFR BLD AUTO: 8.6 % (ref 5–12)
MUCOUS THREADS URNS QL MICRO: ABNORMAL /HPF
NEUTROPHILS NFR BLD AUTO: 4.72 10*3/MM3 (ref 1.7–7)
NEUTROPHILS NFR BLD AUTO: 5.81 10*3/MM3 (ref 1.7–7)
NEUTROPHILS NFR BLD AUTO: 66.2 % (ref 42.7–76)
NEUTROPHILS NFR BLD AUTO: 77 % (ref 42.7–76)
NITRITE UR QL STRIP: NEGATIVE
PH UR STRIP.AUTO: 6 [PH] (ref 5–8)
PLATELET # BLD AUTO: 239 10*3/MM3 (ref 140–450)
PLATELET # BLD AUTO: 304 10*3/MM3 (ref 140–450)
PMV BLD AUTO: 9.3 FL (ref 6–12)
PMV BLD AUTO: 9.4 FL (ref 6–12)
POTASSIUM SERPL-SCNC: 4.6 MMOL/L (ref 3.5–5.2)
POTASSIUM SERPL-SCNC: 5 MMOL/L (ref 3.5–5.2)
PROT SERPL-MCNC: 6.5 G/DL (ref 6–8.5)
PROT SERPL-MCNC: 6.8 G/DL (ref 6–8.5)
PROT UR QL STRIP: NEGATIVE
PSA SERPL-MCNC: 1.58 NG/ML (ref 0–4)
RBC # BLD AUTO: 4.21 10*6/MM3 (ref 4.14–5.8)
RBC # BLD AUTO: 4.79 10*6/MM3 (ref 4.14–5.8)
RBC # UR STRIP: ABNORMAL /HPF
REF LAB TEST METHOD: ABNORMAL
SODIUM SERPL-SCNC: 133 MMOL/L (ref 136–145)
SODIUM SERPL-SCNC: 137 MMOL/L (ref 136–145)
SP GR UR STRIP: 1.02 (ref 1–1.03)
SQUAMOUS #/AREA URNS HPF: ABNORMAL /HPF
STRESS TARGET HR: 112 BPM
T4 FREE SERPL-MCNC: 1.37 NG/DL (ref 0.93–1.7)
TRIGL SERPL-MCNC: 46 MG/DL (ref 0–150)
TSH SERPL DL<=0.05 MIU/L-ACNC: 4.68 UIU/ML (ref 0.27–4.2)
UROBILINOGEN UR QL STRIP: NORMAL
VLDLC SERPL-MCNC: 10 MG/DL (ref 5–40)
WBC # UR STRIP: ABNORMAL /HPF
WBC NRBC COR # BLD: 7.13 10*3/MM3 (ref 3.4–10.8)
WBC NRBC COR # BLD: 7.55 10*3/MM3 (ref 3.4–10.8)

## 2022-01-01 PROCEDURE — 99214 OFFICE O/P EST MOD 30 MIN: CPT | Performed by: NURSE PRACTITIONER

## 2022-01-01 PROCEDURE — 99214 OFFICE O/P EST MOD 30 MIN: CPT | Performed by: FAMILY MEDICINE

## 2022-01-01 PROCEDURE — 99215 OFFICE O/P EST HI 40 MIN: CPT | Performed by: FAMILY MEDICINE

## 2022-01-01 PROCEDURE — 36415 COLL VENOUS BLD VENIPUNCTURE: CPT | Performed by: FAMILY MEDICINE

## 2022-01-01 PROCEDURE — 80053 COMPREHEN METABOLIC PANEL: CPT | Performed by: FAMILY MEDICINE

## 2022-01-01 PROCEDURE — 84466 ASSAY OF TRANSFERRIN: CPT | Performed by: FAMILY MEDICINE

## 2022-01-01 PROCEDURE — 85025 COMPLETE CBC W/AUTO DIFF WBC: CPT | Performed by: FAMILY MEDICINE

## 2022-01-01 PROCEDURE — OUTSIDEPOS PR OUTSIDE POS PLACEHOLDER: Performed by: FAMILY MEDICINE

## 2022-01-01 PROCEDURE — 81001 URINALYSIS AUTO W/SCOPE: CPT | Performed by: FAMILY MEDICINE

## 2022-01-01 PROCEDURE — 84443 ASSAY THYROID STIM HORMONE: CPT | Performed by: FAMILY MEDICINE

## 2022-01-01 PROCEDURE — G0103 PSA SCREENING: HCPCS | Performed by: FAMILY MEDICINE

## 2022-01-01 PROCEDURE — 83036 HEMOGLOBIN GLYCOSYLATED A1C: CPT | Performed by: FAMILY MEDICINE

## 2022-01-01 PROCEDURE — 80061 LIPID PANEL: CPT | Performed by: FAMILY MEDICINE

## 2022-01-01 PROCEDURE — 87086 URINE CULTURE/COLONY COUNT: CPT | Performed by: FAMILY MEDICINE

## 2022-01-01 PROCEDURE — 84439 ASSAY OF FREE THYROXINE: CPT | Performed by: FAMILY MEDICINE

## 2022-01-01 PROCEDURE — 83540 ASSAY OF IRON: CPT | Performed by: FAMILY MEDICINE

## 2022-01-01 RX ORDER — LISINOPRIL 20 MG/1
20 TABLET ORAL DAILY
Qty: 90 TABLET | Refills: 0 | Status: SHIPPED | OUTPATIENT
Start: 2022-01-01 | End: 2022-01-01 | Stop reason: ALTCHOICE

## 2022-01-01 RX ORDER — DULOXETIN HYDROCHLORIDE 30 MG/1
30 CAPSULE, DELAYED RELEASE ORAL DAILY
Qty: 30 CAPSULE | Refills: 2 | Status: SHIPPED | OUTPATIENT
Start: 2022-01-01

## 2022-01-01 RX ORDER — MAGNESIUM OXIDE 240 MG
POWDER IN PACKET (EA) ORAL 2 TIMES DAILY
COMMUNITY

## 2022-01-01 RX ORDER — CYCLOBENZAPRINE HCL 10 MG
TABLET ORAL
Qty: 30 TABLET | OUTPATIENT
Start: 2022-01-01

## 2022-01-01 RX ORDER — ATORVASTATIN CALCIUM 10 MG/1
TABLET, FILM COATED ORAL
Qty: 30 TABLET | Refills: 2 | Status: SHIPPED | OUTPATIENT
Start: 2022-01-01

## 2022-01-01 RX ORDER — FERROUS SULFATE TAB EC 324 MG (65 MG FE EQUIVALENT) 324 (65 FE) MG
324 TABLET DELAYED RESPONSE ORAL
COMMUNITY

## 2022-01-01 RX ORDER — LISINOPRIL 20 MG/1
TABLET ORAL
Qty: 90 TABLET | Refills: 0 | OUTPATIENT
Start: 2022-01-01

## 2022-01-01 RX ORDER — AMLODIPINE BESYLATE 5 MG/1
5 TABLET ORAL DAILY
Qty: 90 TABLET | Refills: 0 | Status: SHIPPED | OUTPATIENT
Start: 2022-01-01

## 2022-01-01 RX ORDER — LISINOPRIL 30 MG/1
TABLET ORAL
Qty: 30 TABLET | Refills: 1 | Status: SHIPPED | OUTPATIENT
Start: 2022-01-01 | End: 2022-01-01

## 2022-01-01 RX ORDER — FOLIC ACID 1 MG/1
1 TABLET ORAL DAILY
COMMUNITY

## 2022-01-01 RX ORDER — CYCLOBENZAPRINE HCL 10 MG
10 TABLET ORAL 3 TIMES DAILY PRN
COMMUNITY

## 2022-01-01 RX ORDER — SODIUM CHLORIDE FOR INHALATION 0.9 %
3 VIAL, NEBULIZER (ML) INHALATION AS NEEDED
COMMUNITY
End: 2022-01-01 | Stop reason: ALTCHOICE

## 2022-01-01 RX ORDER — BIMATOPROST 0.01 %
DROPS OPHTHALMIC (EYE)
COMMUNITY
Start: 2022-01-01

## 2022-01-01 RX ORDER — FOLIC ACID 1 MG/1
1 TABLET ORAL DAILY
Qty: 30 TABLET | Refills: 0 | OUTPATIENT
Start: 2022-01-01

## 2022-01-01 RX ORDER — ASPIRIN 81 MG/1
81 TABLET ORAL DAILY
COMMUNITY
End: 2022-01-01 | Stop reason: SDUPTHER

## 2022-01-01 RX ORDER — LISINOPRIL 30 MG/1
TABLET ORAL
Qty: 30 TABLET | Refills: 5 | Status: SHIPPED | OUTPATIENT
Start: 2022-01-01 | End: 2022-01-01 | Stop reason: ALTCHOICE

## 2022-01-01 RX ORDER — ATORVASTATIN CALCIUM 10 MG/1
TABLET, FILM COATED ORAL
Qty: 30 TABLET | Refills: 2 | OUTPATIENT
Start: 2022-01-01

## 2022-01-01 RX ORDER — AMLODIPINE BESYLATE 10 MG/1
TABLET ORAL
Qty: 30 TABLET | Refills: 2 | Status: SHIPPED | OUTPATIENT
Start: 2022-01-01 | End: 2022-01-01

## 2022-01-01 RX ORDER — TIMOLOL MALEATE 5 MG/ML
SOLUTION/ DROPS OPHTHALMIC
COMMUNITY
Start: 2022-01-01

## 2022-04-20 PROBLEM — R63.4 WEIGHT LOSS: Status: ACTIVE | Noted: 2022-01-01

## 2022-04-20 NOTE — PROGRESS NOTES
"Chief Complaint  Hyperlipidemia and Hypertension    Subjective          Billy Larry presents to [facility]  History of Present Illness  Here for follow-up of his chronic medical issues.  Has history of prediabetes and follows his blood sugars which was done quite well.  Also has hypertension keeps a list of blood pressures which likewise have done well.  Has significant osteoarthritis especially affecting right knee issues due to his failed total right knee replacement.  Last time I saw him we tried him on tramadol for pain relief.  Not taking Tramadol because scared of the symptoms on the list of possible side effects.   We discussed his concerns and I assured him that while there is reason to be cautious that if in his judgment his functional status is limited enough and he if he is suffering from pain and that it would be appropriate to at least try the medication to see if it helps improve his functional status without causing significant side effects.  Weight loss noted.  He says he doesn't eat meals any more.  \"Most of meals are snacks.\"  Reports some tingling in 2nd, 3rd digits of both hands.  Spilled hot coffee on the left hand.     Current Outpatient Medications   Medication Sig Dispense Refill   • acetaminophen (TYLENOL) 500 MG tablet Take 500 mg by mouth Every 6 (Six) Hours As Needed for Mild Pain .     • amLODIPine (NORVASC) 10 MG tablet TAKE ONE TABLET BY MOUTH DAILY 30 tablet 2   • aspirin 81 MG tablet Take 81 mg by mouth Daily.     • atorvastatin (LIPITOR) 10 MG tablet TAKE ONE TABLET BY MOUTH ONCE NIGHTLY 30 tablet 2   • ferrous sulfate 324 (65 Fe) MG tablet delayed-release EC tablet Take 324 mg by mouth Daily With Breakfast.     • lisinopril (PRINIVIL,ZESTRIL) 30 MG tablet TAKE ONE TABLET BY MOUTH DAILY 30 tablet 1   • DULoxetine (CYMBALTA) 30 MG capsule Take 1 capsule by mouth Daily. 30 capsule 2   • traMADol (ULTRAM) 50 MG tablet Take 1 tablet by mouth 3 " "(Three) Times a Day. For pain. 90 tablet 1     No current facility-administered medications for this visit.       Review of Systems   Constitutional: Negative for chills, fatigue and fever.   Respiratory: Negative for chest tightness, shortness of breath and wheezing.    Cardiovascular: Negative for chest pain and palpitations.   Gastrointestinal: Negative for constipation, diarrhea, nausea and vomiting.   Genitourinary: Negative for dysuria, hematuria and urgency.   Neurological: Negative for weakness and headache.   Psychiatric/Behavioral: Negative for hallucinations.            Objective   Vital Signs:   /58 (BP Location: Left arm, Patient Position: Sitting)   Pulse 65   Temp 98.7 °F (37.1 °C) (Oral)   Ht 173.4 cm (68.27\")   Wt 73 kg (161 lb)   BMI 24.29 kg/m²     Physical Exam   Constitutional:       Appearance: Normal appearance.   HENT:      Ears:      Comments: Very hard of hearing even with hearing aids in place  Eyes:      General: No scleral icterus.     Conjunctiva/sclera: Conjunctivae normal.   Neck:      Vascular: No carotid bruit.   Cardiovascular:      Rate and Rhythm: Normal rate and regular rhythm.      Heart sounds: Murmur heard.    Systolic murmur is present with a grade of 2/6.       Pulmonary:      Effort: No respiratory distress.      Breath sounds: No wheezing or rales.   Musculoskeletal:      Right lower leg: No edema.      Left lower leg: No edema.      Comments: The right knee has limited extension: Chronically flexed about 20 degrees   Skin: Left hand does have some desquamating skin on the palmar of the ulnar aspect of the hand   Lymphadenopathy:      Cervical: No cervical adenopathy.   Neurological:      General: No focal deficit present.      Mental Status: He is alert.   Psychiatric:         Attention and Perception: Attention normal.         Mood and Affect: Mood normal.         Speech: Speech normal.     Result Review :                     Assessment and Plan    Diagnoses " and all orders for this visit:    1. Pre-diabetes (Primary)  Comments:  Generally if sugars do well.  We will get labs and predicate medication change based on results  Orders:  -     Hemoglobin A1c    2. Essential hypertension, benign  Comments:  Blood pressure looks good today and his log looks good.  Continue current regimen  Orders:  -     Comprehensive Metabolic Panel  -     CBC Auto Differential    3. Mixed hyperlipidemia  Comments:  We will check lab predicate medication change based on results  Orders:  -     Lipid Panel  -     Comprehensive Metabolic Panel    4. Primary generalized hypertrophic osteoarthrosis  Comments:  He has tramadol available he can use.  Discussed his concerns as noted above.    5. Failed total right knee replacement, subsequent encounter  Comments:  Restricted range of motion appears stable.  I certainly think it reasonable for him to consider a trial of use of the tramadol    6. Conductive hearing loss, bilateral    7. Screening for prostate cancer  -     PSA Screen    8. Weight loss  Comments:  We will see what labs look like and will include PSA even though his age is 88  Orders:  -     Comprehensive Metabolic Panel  -     CBC Auto Differential  -     TSH    9. Need for vaccination    10. High thyroid stimulating hormone (TSH) level  Comments:  TSH returned elevated we will get follow-up free T4  Orders:  -     T4, Free    Other orders  -     DULoxetine (CYMBALTA) 30 MG capsule; Take 1 capsule by mouth Daily.  Dispense: 30 capsule; Refill: 2        Follow Up   No follow-ups on file.  Patient was given instructions and counseling regarding his condition or for health maintenance advice. Please see specific information pulled into the AVS if appropriate.

## 2022-04-28 ENCOUNTER — HOSPITAL ENCOUNTER (EMERGENCY)
Dept: HOSPITAL 49 - FER | Age: 87
Discharge: HOME | End: 2022-04-28
Payer: COMMERCIAL

## 2022-04-28 DIAGNOSIS — I10: ICD-10-CM

## 2022-04-28 DIAGNOSIS — R55: Primary | ICD-10-CM

## 2022-04-28 DIAGNOSIS — Z88.6: ICD-10-CM

## 2022-04-28 DIAGNOSIS — Z88.1: ICD-10-CM

## 2022-04-28 DIAGNOSIS — I95.9: ICD-10-CM

## 2022-04-28 LAB
ALBUMIN SERPL-MCNC: 3.7 G/DL (ref 3.4–5)
ALKALINE PHOSHATASE: 104 U/L (ref 46–116)
ALT SERPL-CCNC: 28 U/L (ref 16–63)
AST: 25 U/L (ref 15–37)
BASOPHIL: 0.2 % (ref 0–2)
BILIRUBIN - TOTAL: 1 MG/DL (ref 0.2–1)
BILIRUBIN: (no result) MG/DL
BLOOD: NEGATIVE ERY/UL
BUN SERPL-MCNC: 45 MG/DL (ref 7–18)
BUN/CREAT RATIO (CALC): 25.3 RATIO
CHLORIDE: 98 MMOL/L (ref 98–107)
CLARITY UR: CLEAR
CO2 (BICARBONATE): 21 MMOL/L (ref 21–32)
COLOR: YELLOW
CREATININE: 1.78 MG/DL (ref 0.67–1.17)
EOSINOPHIL: 0 % (ref 0–7)
GLOBULIN (CALCULATION): 3.4 G/DL
GLUCOSE (U): NORMAL MG/DL
GLUCOSE SERPL-MCNC: 112 MG/DL (ref 74–106)
HCT: 46.3 % (ref 42–52)
HGB BLD-MCNC: 15.6 G/DL (ref 13.2–18)
LEUKOCYTES: NEGATIVE LEU/UL
LYMPHOCYTE: 7.4 % (ref 15–48)
MCH RBC QN AUTO: 32 PG (ref 25–31)
MCHC RBC AUTO-ENTMCNC: 33.7 G/DL (ref 32–36)
MCV: 94.9 FL (ref 78–100)
MONOCYTE: 5.3 % (ref 0–12)
MPV: 9.7 FL (ref 6–9.5)
NEUTROPHIL: 86.8 % (ref 41–80)
NITRITE: NEGATIVE MG/DL
NRBC: 0
PLT: 240 K/UL (ref 150–400)
POTASSIUM: 4.8 MMOL/L (ref 3.5–5.1)
PROTEIN: NEGATIVE MG/DL
RBC MORPHOLOGY: NORMAL
RBC: 4.88 M/UL (ref 4.7–6)
RDW: 13.8 % (ref 11.5–14)
SPECIFIC GRAVITY: >=1.03 (ref 1–1.03)
TOTAL PROTEIN: 7.1 G/DL (ref 6.4–8.2)
UROBILINOGEN: 0.2 MG/DL (ref 0.2–1)
WBC: 11.5 K/UL (ref 4–10.5)

## 2022-05-02 NOTE — TELEPHONE ENCOUNTER
Phone number for Jessa she left on voice mail is [phone]. She stated if I couldn't get a hold of pt she could go to his house to make sure he is able to /answer the phone. -clr

## 2022-05-02 NOTE — TELEPHONE ENCOUNTER
Jessa a caregiver of pt stopped by the office. She also called and had left a voice mail.     She come in to voice some concerns and worries herself and patients neighbors have in regards to pt and his safety. She informed me pt went to the ER ([facility]) 4/28/2022- fax sent to medical records, for a fall on Thursday 4/28/2022. Jessa informed me that pt has fallen 4 times since. Jessa brought a copy of the papers pt was sent home with from the ER and it says he was seen for Syncope and Hypotension.     Dr. Herrera are you willing to see or work pt in? When I auto search for hospital follow up it takes me to 5/27/2022 and OV takes me to June. Please advise    I did explain to Jessa that due to hippa I'm unable to speak to her about pt, and that once I hear back from dr herrera I would have to contact him with that information. She voiced understanding.       clr

## 2022-05-04 NOTE — TELEPHONE ENCOUNTER
Absolutely!  Lets get him in to be seen.  Today at 3PM would be good for me if he can make it.  Or squeeze him in on Friday

## 2022-05-06 PROBLEM — I95.1 ORTHOSTASIS: Status: ACTIVE | Noted: 2022-01-01

## 2022-05-06 PROBLEM — I95.2 HYPOTENSION DUE TO DRUGS: Status: ACTIVE | Noted: 2022-01-01

## 2022-05-06 PROBLEM — R55 SYNCOPE: Status: ACTIVE | Noted: 2022-01-01

## 2022-05-06 PROBLEM — I95.1 ORTHOSTASIS: Status: RESOLVED | Noted: 2022-01-01 | Resolved: 2022-01-01

## 2022-05-06 PROBLEM — R00.1 BRADYCARDIA: Status: ACTIVE | Noted: 2022-01-01

## 2022-05-06 NOTE — PROGRESS NOTES
Chief Complaint  Fall (Follow up - 2 weeks ago 4/28/22 ), Hypertension, Hyperlipidemia, Prediabetes, and Osteoarthritis    Subjective          Billy Larry presents to [facility]  History of Present Illness  Mr. Larry was recently in the emergency room what was listed as a diagnosis of syncope and low blood pressure.  However he tells me that he did not really pass out that he was standing up and his foot slipped on the tile surface slid down to the floor and could not get back up.  Which version of the story is true is hard to tell.  He does admit that he has had 1 other fall since he was in the emergency room.  His  has notified us of concerns saying that he is actually had more falls and that.  He does not yet have a lifeline but says he knows he should get one  He is very concerned about finances and insurance as evidently he does not have Medicare.  Presently he says he feels okay.  Blood pressure is noted to be low today.        Current Outpatient Medications   Medication Sig Dispense Refill   • aspirin 81 MG tablet Take 81 mg by mouth Daily.     • atorvastatin (LIPITOR) 10 MG tablet TAKE ONE TABLET BY MOUTH ONCE NIGHTLY 30 tablet 2   • bimatoprost (Lumigan) 0.01 % ophthalmic drops Apply  to eye(s) as directed by provider.     • DULoxetine (CYMBALTA) 30 MG capsule Take 1 capsule by mouth Daily. 30 capsule 2   • ferrous sulfate 324 (65 Fe) MG tablet delayed-release EC tablet Take 324 mg by mouth Daily With Breakfast.     • timolol (TIMOPTIC) 0.5 % ophthalmic solution      • traMADol (ULTRAM) 50 MG tablet Take 1 tablet by mouth 3 (Three) Times a Day. For pain. 90 tablet 1   • acetaminophen (TYLENOL) 500 MG tablet Take 500 mg by mouth Every 6 (Six) Hours As Needed for Mild Pain .     • amLODIPine (NORVASC) 5 MG tablet Take 1 tablet by mouth Daily. 90 tablet 0   • lisinopril (PRINIVIL,ZESTRIL) 20 MG tablet Take 1 tablet by mouth Daily. 90 tablet 0     No current  "facility-administered medications for this visit.       Review of Systems         Objective   Vital Signs:   BP 90/53 (BP Location: Left arm, Patient Position: Sitting, Cuff Size: Adult)   Pulse 54   Ht 173.4 cm (68.27\")   Wt 73.5 kg (162 lb)   SpO2 98%   BMI 24.44 kg/m²     Physical Exam   He is in no acute distress today  In fact he looks about the same as he did at his last visit  He is hard of hearing  Eyes are nonicteric  Heart is regular rhythm 2/6 systolic murmur  Lungs with good air movement no wheeze  Abdomen bowel sounds positive soft nontender  Lower extremities trace edema at the ankles.  Right knee globular in configuration and unable to fully extend.  Neurologic without lateralizing focal neurologic deficit.  He seems a little unsure of himself today and maybe not comprehending as well as usual.  It is a little bit hard to assess because he is so hard of hearing      Result Review :                     Assessment and Plan    Diagnoses and all orders for this visit:    1. Essential hypertension, benign (Primary)  Comments:  We will lower the dose of his antihypertensive medications.  Get home health to test need for further adjustments.  Assessment & Plan:  Decrease the amlodipine from 10 mg to 5 mg daily.  Also decrease the lisinopril from 30mg to 20 mg daily    Orders:  -     lisinopril (PRINIVIL,ZESTRIL) 20 MG tablet; Take 1 tablet by mouth Daily.  Dispense: 90 tablet; Refill: 0  -     amLODIPine (NORVASC) 5 MG tablet; Take 1 tablet by mouth Daily.  Dispense: 90 tablet; Refill: 0  -     Ambulatory Referral to Home Health    2. Hypotension due to drugs  Comments:  As above  Orders:  -     lisinopril (PRINIVIL,ZESTRIL) 20 MG tablet; Take 1 tablet by mouth Daily.  Dispense: 90 tablet; Refill: 0  -     amLODIPine (NORVASC) 5 MG tablet; Take 1 tablet by mouth Daily.  Dispense: 90 tablet; Refill: 0  -     Ambulatory Referral to Home Health    3. Syncope, unspecified syncope type  Comments:  As above.  " Encouraged him to get a life alert type device  Orders:  -     lisinopril (PRINIVIL,ZESTRIL) 20 MG tablet; Take 1 tablet by mouth Daily.  Dispense: 90 tablet; Refill: 0  -     amLODIPine (NORVASC) 5 MG tablet; Take 1 tablet by mouth Daily.  Dispense: 90 tablet; Refill: 0  -     Holter monitor - 48 hour  -     Ambulatory Referral to Home Health    4. Bradycardia  Comments:  If pulse remains low will get Holter monitor.  Orders:  -     Holter monitor - 48 hour  -     Ambulatory Referral to Home Health    5. Fall, initial encounter  Assessment & Plan:  I have concerns about his recurrent falls.  I am going to get physical therapy and for safety evaluation and gait training.  Get  home health to  follow his blood pressures as he has been little hypotensive-orthostatic.  They can also help assess his cognitive issues.  He may need  involved to start next step planning.    Orders:  -     Ambulatory Referral to Home Health    6. Chronic pain of right knee  -     Ambulatory Referral to Home Health    7. Gait disturbance  -     Ambulatory Referral to Home Health      Follow Up   No follow-ups on file.  Patient was given instructions and counseling regarding his condition or for health maintenance advice. Please see specific information pulled into the AVS if appropriate.

## 2022-05-08 PROBLEM — R26.9 GAIT DISTURBANCE: Status: ACTIVE | Noted: 2022-01-01
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
## 2022-05-09 NOTE — ASSESSMENT & PLAN NOTE
I have concerns about his recurrent falls.  I am going to get physical therapy and for safety evaluation and gait training.  Get  home health to  follow his blood pressures as he has been little hypotensive-orthostatic.  They can also help assess his cognitive issues.  He may need  involved to start next step planning.

## 2022-05-09 NOTE — ASSESSMENT & PLAN NOTE
Decrease the amlodipine from 10 mg to 5 mg daily.  Also decrease the lisinopril from 30mg to 20 mg daily

## 2022-05-23 ENCOUNTER — HOSPITAL ENCOUNTER (INPATIENT)
Dept: HOSPITAL 49 - FER | Age: 87
LOS: 9 days | Discharge: NURSING, REHAB OR LONG-TERM CARE | DRG: 689 | End: 2022-06-01
Attending: FAMILY MEDICINE | Admitting: INTERNAL MEDICINE
Payer: COMMERCIAL

## 2022-05-23 VITALS — WEIGHT: 145.51 LBS | BODY MASS INDEX: 20.83 KG/M2 | HEIGHT: 70 IN

## 2022-05-23 DIAGNOSIS — M25.572: ICD-10-CM

## 2022-05-23 DIAGNOSIS — I10: ICD-10-CM

## 2022-05-23 DIAGNOSIS — B95.2: ICD-10-CM

## 2022-05-23 DIAGNOSIS — I95.1: ICD-10-CM

## 2022-05-23 DIAGNOSIS — Z87.81: ICD-10-CM

## 2022-05-23 DIAGNOSIS — F41.9: ICD-10-CM

## 2022-05-23 DIAGNOSIS — N48.1: ICD-10-CM

## 2022-05-23 DIAGNOSIS — Z79.899: ICD-10-CM

## 2022-05-23 DIAGNOSIS — Z88.1: ICD-10-CM

## 2022-05-23 DIAGNOSIS — E11.9: ICD-10-CM

## 2022-05-23 DIAGNOSIS — F10.221: ICD-10-CM

## 2022-05-23 DIAGNOSIS — Z82.49: ICD-10-CM

## 2022-05-23 DIAGNOSIS — M25.571: ICD-10-CM

## 2022-05-23 DIAGNOSIS — E78.5: ICD-10-CM

## 2022-05-23 DIAGNOSIS — T84.84XA: ICD-10-CM

## 2022-05-23 DIAGNOSIS — I25.10: ICD-10-CM

## 2022-05-23 DIAGNOSIS — N30.01: Primary | ICD-10-CM

## 2022-05-23 DIAGNOSIS — B96.4: ICD-10-CM

## 2022-05-23 DIAGNOSIS — F10.239: ICD-10-CM

## 2022-05-23 DIAGNOSIS — R44.0: ICD-10-CM

## 2022-05-23 DIAGNOSIS — R44.1: ICD-10-CM

## 2022-05-23 DIAGNOSIS — Z20.822: ICD-10-CM

## 2022-05-23 DIAGNOSIS — Z96.651: ICD-10-CM
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
## 2022-05-23 DIAGNOSIS — Z98.890: ICD-10-CM

## 2022-05-23 DIAGNOSIS — G93.41: ICD-10-CM

## 2022-05-23 DIAGNOSIS — Z16.12: ICD-10-CM

## 2022-05-23 LAB
ALBUMIN SERPL-MCNC: 3 G/DL (ref 3.4–5)
ALKALINE PHOSHATASE: 107 U/L (ref 46–116)
ALT SERPL-CCNC: 51 U/L (ref 16–63)
AST: 54 U/L (ref 15–37)
BACTERIA: (no result)
BASOPHIL: 0.4 % (ref 0–2)
BILIRUBIN - TOTAL: 0.9 MG/DL (ref 0.2–1)
BILIRUBIN: NEGATIVE MG/DL
BLOOD: (no result) ERY/UL
BUN SERPL-MCNC: 20 MG/DL (ref 7–18)
BUN/CREAT RATIO (CALC): 24.1 RATIO
CHLORIDE: 98 MMOL/L (ref 98–107)
CLARITY UR: (no result)
CO2 (BICARBONATE): 29 MMOL/L (ref 21–32)
COLOR: YELLOW
CREATININE: 0.83 MG/DL (ref 0.67–1.17)
EOSINOPHIL: 0.7 % (ref 0–7)
GLOBULIN (CALCULATION): 3.2 G/DL
GLUCOSE (U): NORMAL MG/DL
GLUCOSE SERPL-MCNC: 80 MG/DL (ref 74–106)
HCT: 44.2 % (ref 42–52)
HGB BLD-MCNC: 15.1 G/DL (ref 13.2–18)
LEUKOCYTES: (no result) LEU/UL
LYMPHOCYTE: 9.1 % (ref 15–48)
MCH RBC QN AUTO: 31.7 PG (ref 25–31)
MCHC RBC AUTO-ENTMCNC: 34.2 G/DL (ref 32–36)
MCV: 92.7 FL (ref 78–100)
MONOCYTE: 6.4 % (ref 0–12)
MPV: 10.9 FL (ref 6–9.5)
MUCOUS: (no result)
NEUTROPHIL: 83 % (ref 41–80)
NITRITE: NEGATIVE MG/DL
NRBC: 0
PLT: 169 K/UL (ref 150–400)
POTASSIUM: 4.3 MMOL/L (ref 3.5–5.1)
PROTEIN: (no result) MG/DL
RBC MORPHOLOGY: NORMAL
RBC: 4.77 M/UL (ref 4.7–6)
RDW: 13.9 % (ref 11.5–14)
SPECIFIC GRAVITY: 1.01 (ref 1–1.03)
SPERM: PRESENT
SQUAMOUS EPITHELIAL CELLS: (no result)
TOTAL PROTEIN: 6.2 G/DL (ref 6.4–8.2)
URINARY RBC: (no result)
UROBILINOGEN: 0.2 MG/DL (ref 0.2–1)
WBC: 10.4 K/UL (ref 4–10.5)

## 2022-05-23 PROCEDURE — U0002 COVID-19 LAB TEST NON-CDC: HCPCS

## 2022-05-23 NOTE — TELEPHONE ENCOUNTER
Caller: MARIAM ELLIS    Relationship: Other    Best call back number: [phone]    What is the best time to reach you: ANYTIME     Who are you requesting to speak with (clinical staff, provider,  specific staff member): CLINICAL         What was the call regarding:     MARIAM ELLIS WANTED TO UPDATE PCP CLAUDIA THAT SHE HAS SPENT THE WEEKEND TAKING CARE OF THE PATIENT. SHE SAID THAT HE HAS BEEN SEEING THINGS THAT ARE NOT THERE.  SHE SAID THAT THE PATIENT HAS SOILED HIS CLOTHES AND BED. MARIAM SAID THE PATIENT IS ALONE AND IS NOT ABLE TO TAKE CARE OF HIMSELF. SHE SAID SHE IS UNDER A LOT OF STRESS AND NEEDS TO SPEAK TO THE PCP CLAUDIA.      SHE SAID THAT PCP CLAUDIA HAD GIVEN THE PATIENT  PAPERWORK TO SIGN SO SHE CAN HELP HIM MEDICALLY OR TO HAVE SOME CONTROL FOR THE PATIENT BUT HE WOULD NOT SIGN IT. SHE SAID SHE WOULD BE IN THE HOSPITAL FOR AWHILE BUT WILL NOT STAY THERE ALL DAY. HUB TRIED TO TRANSFER THE CALL BUT MS ELLIS IS NOT ON THE  VERBAL AND THE PERSON AT THE OFFICE DID NOT TAKE THE CALL.     PLEASE CALL TO DISCUSS.       Do you require a callback: YES

## 2022-05-25 LAB
BASOPHIL: 0.2 % (ref 0–2)
EOSINOPHIL: 0.2 % (ref 0–7)
HCT: 42.3 % (ref 42–52)
HGB BLD-MCNC: 14 G/DL (ref 13.2–18)
LYMPHOCYTE: 7.8 % (ref 15–48)
MCH RBC QN AUTO: 31.2 PG (ref 25–31)
MCHC RBC AUTO-ENTMCNC: 33.1 G/DL (ref 32–36)
MCV: 94.2 FL (ref 78–100)
MONOCYTE: 5.9 % (ref 0–12)
MPV: 10.8 FL (ref 6–9.5)
NEUTROPHIL: 85.5 % (ref 41–80)
NRBC: 0
PLT: 157 K/UL (ref 150–400)
RBC MORPHOLOGY: NORMAL
RBC: 4.49 M/UL (ref 4.7–6)
RDW: 14.6 % (ref 11.5–14)
WBC: 11.8 K/UL (ref 4–10.5)

## 2022-05-25 PROCEDURE — HZ2ZZZZ DETOXIFICATION SERVICES FOR SUBSTANCE ABUSE TREATMENT: ICD-10-PCS | Performed by: INTERNAL MEDICINE

## 2022-05-25 NOTE — TELEPHONE ENCOUNTER
Tawana called from the hospital:   1.  Needing a  for Ced, he is in the hospital   2.  Needed to know which home health agency we referred him to earlier in the month.   3. She reports that they moved him today from i[facility] to TCU - they believe that he is in detox.

## 2022-05-25 NOTE — NUR
22 Mr. Méndez was oreinted to name,  and address when an assessment was
attempted on . He was note oriented to place, time, or situation. Mr. Méndez's condition has deteriorated and he has been moved to TCU. ETOH
withdrawal is suspected. - In attempts to locate next of kin, Irina Moura,
 / assistant, [phone], was contacted. She reports perform
grocery shopping, keep house, meals preparation at times and to have stayed
over night last weekend due to decline in condition. Ms. Moura reports
several recent falls. Mr. Méndez has a rw and s. seat. A recent referral was
made to [facility] by Dr. Wilhelm, PCP. Ms. Moura provided the information re: a
first counsin, Maryana Ortega, [phone]. -  Ms. Ortega reports that Mr. Méndez
has 2 daughters in [place] whom Mr. Méndez has not had contact in msy years.
He has a sister, Cherrie Barrera in [place], [place]. The telephone # was
disconnected the last time Mr. Méndez attempted to reach his sister. - Ms. Ortega
was agreeable to be contacted if consent is needed in case of an emergency. -
[facility] was notified of admission. - Report given to Dr. Nelson.

## 2022-05-26 LAB
ALBUMIN SERPL-MCNC: 2.4 G/DL (ref 3.4–5)
ALKALINE PHOSHATASE: 96 U/L (ref 46–116)
ALT SERPL-CCNC: 34 U/L (ref 16–63)
AST: 34 U/L (ref 15–37)
BASOPHIL: 0.2 % (ref 0–2)
BILIRUBIN - DIRECT: 0.2 MG/DL (ref 0–0.2)
BILIRUBIN - TOTAL: 1 MG/DL (ref 0.2–1)
BUN SERPL-MCNC: 17 MG/DL (ref 7–18)
BUN/CREAT RATIO (CALC): 18.9 RATIO
CHLORIDE: 101 MMOL/L (ref 98–107)
CO2 (BICARBONATE): 28 MMOL/L (ref 21–32)
CREATININE: 0.9 MG/DL (ref 0.67–1.17)
EOSINOPHIL: 0.7 % (ref 0–7)
GLOBULIN (CALCULATION): 3.3 G/DL
GLUCOSE SERPL-MCNC: 77 MG/DL (ref 74–106)
HCT: 40.7 % (ref 42–52)
HGB BLD-MCNC: 13.5 G/DL (ref 13.2–18)
LYMPHOCYTE: 11.1 % (ref 15–48)
MCH RBC QN AUTO: 31.3 PG (ref 25–31)
MCHC RBC AUTO-ENTMCNC: 33.2 G/DL (ref 32–36)
MCV: 94.2 FL (ref 78–100)
MONOCYTE: 6.7 % (ref 0–12)
MPV: 10.9 FL (ref 6–9.5)
NEUTROPHIL: 80.9 % (ref 41–80)
NRBC: 0
PLT: 155 K/UL (ref 150–400)
POTASSIUM: 3.7 MMOL/L (ref 3.5–5.1)
RBC MORPHOLOGY: NORMAL
RBC: 4.32 M/UL (ref 4.7–6)
RDW: 14.6 % (ref 11.5–14)
TOTAL PROTEIN: 5.7 G/DL (ref 6.4–8.2)
WBC: 9.6 K/UL (ref 4–10.5)

## 2022-05-27 LAB
BASOPHIL: 0.3 % (ref 0–2)
BUN SERPL-MCNC: 18 MG/DL (ref 7–18)
BUN/CREAT RATIO (CALC): 24.3 RATIO
CHLORIDE: 101 MMOL/L (ref 98–107)
CO2 (BICARBONATE): 30 MMOL/L (ref 21–32)
CREATININE: 0.74 MG/DL (ref 0.67–1.17)
EOSINOPHIL: 1.7 % (ref 0–7)
GLUCOSE SERPL-MCNC: 81 MG/DL (ref 74–106)
HCT: 40.2 % (ref 42–52)
HGB BLD-MCNC: 13.5 G/DL (ref 13.2–18)
LYMPHOCYTE: 13.1 % (ref 15–48)
MCH RBC QN AUTO: 31.9 PG (ref 25–31)
MCHC RBC AUTO-ENTMCNC: 33.6 G/DL (ref 32–36)
MCV: 95 FL (ref 78–100)
MONOCYTE: 9.8 % (ref 0–12)
MPV: 10.7 FL (ref 6–9.5)
NEUTROPHIL: 74.5 % (ref 41–80)
NRBC: 0
PLT: 158 K/UL (ref 150–400)
POTASSIUM: 4.1 MMOL/L (ref 3.5–5.1)
RBC MORPHOLOGY: NORMAL
RBC: 4.23 M/UL (ref 4.7–6)
RDW: 14.7 % (ref 11.5–14)
WBC: 6.4 K/UL (ref 4–10.5)

## 2022-05-28 LAB
BASOPHIL: 0.5 % (ref 0–2)
BUN SERPL-MCNC: 13 MG/DL (ref 7–18)
BUN/CREAT RATIO (CALC): 19.7 RATIO
CHLORIDE: 100 MMOL/L (ref 98–107)
CO2 (BICARBONATE): 29 MMOL/L (ref 21–32)
CREATININE: 0.66 MG/DL (ref 0.67–1.17)
EOSINOPHIL: 2.1 % (ref 0–7)
GLUCOSE SERPL-MCNC: 73 MG/DL (ref 74–106)
HCT: 40.2 % (ref 42–52)
HGB BLD-MCNC: 13.6 G/DL (ref 13.2–18)
LYMPHOCYTE: 13.3 % (ref 15–48)
MCH RBC QN AUTO: 31.9 PG (ref 25–31)
MCHC RBC AUTO-ENTMCNC: 33.8 G/DL (ref 32–36)
MCV: 94.4 FL (ref 78–100)
MONOCYTE: 10.3 % (ref 0–12)
MPV: 10.6 FL (ref 6–9.5)
NEUTROPHIL: 72.8 % (ref 41–80)
NRBC: 0
PLT: 161 K/UL (ref 150–400)
POTASSIUM: 4.3 MMOL/L (ref 3.5–5.1)
RBC MORPHOLOGY: NORMAL
RBC: 4.26 M/UL (ref 4.7–6)
RDW: 14.5 % (ref 11.5–14)
WBC: 6.3 K/UL (ref 4–10.5)

## 2022-05-29 LAB
BASOPHIL: 0.6 % (ref 0–2)
BUN SERPL-MCNC: 15 MG/DL (ref 7–18)
BUN/CREAT RATIO (CALC): 20.8 RATIO
CHLORIDE: 99 MMOL/L (ref 98–107)
CO2 (BICARBONATE): 28 MMOL/L (ref 21–32)
CREATININE: 0.72 MG/DL (ref 0.67–1.17)
EOSINOPHIL: 1.4 % (ref 0–7)
GLUCOSE SERPL-MCNC: 138 MG/DL (ref 74–106)
HCT: 42.2 % (ref 42–52)
HGB BLD-MCNC: 13.6 G/DL (ref 13.2–18)
LYMPHOCYTE: 10.4 % (ref 15–48)
MCH RBC QN AUTO: 31.3 PG (ref 25–31)
MCHC RBC AUTO-ENTMCNC: 32.2 G/DL (ref 32–36)
MCV: 97 FL (ref 78–100)
MONOCYTE: 9.7 % (ref 0–12)
MPV: 10.4 FL (ref 6–9.5)
NEUTROPHIL: 77.1 % (ref 41–80)
NRBC: 0
PLT: 190 K/UL (ref 150–400)
POTASSIUM: 4 MMOL/L (ref 3.5–5.1)
RBC MORPHOLOGY: NORMAL
RBC: 4.35 M/UL (ref 4.7–6)
RDW: 14.6 % (ref 11.5–14)
WBC: 9.1 K/UL (ref 4–10.5)

## 2022-05-30 LAB
BASOPHIL: 0.6 % (ref 0–2)
BUN SERPL-MCNC: 14 MG/DL (ref 7–18)
BUN/CREAT RATIO (CALC): 21.9 RATIO
CHLORIDE: 102 MMOL/L (ref 98–107)
CO2 (BICARBONATE): 31 MMOL/L (ref 21–32)
CREATININE: 0.64 MG/DL (ref 0.67–1.17)
EOSINOPHIL: 1.6 % (ref 0–7)
GLUCOSE SERPL-MCNC: 89 MG/DL (ref 74–106)
HCT: 39.9 % (ref 42–52)
HGB BLD-MCNC: 13.3 G/DL (ref 13.2–18)
LYMPHOCYTE: 11.1 % (ref 15–48)
MCH RBC QN AUTO: 31.7 PG (ref 25–31)
MCHC RBC AUTO-ENTMCNC: 33.3 G/DL (ref 32–36)
MCV: 95 FL (ref 78–100)
MONOCYTE: 8.8 % (ref 0–12)
MPV: 10.6 FL (ref 6–9.5)
NEUTROPHIL: 76.9 % (ref 41–80)
NRBC: 0
PLT: 214 K/UL (ref 150–400)
POTASSIUM: 4.2 MMOL/L (ref 3.5–5.1)
RBC MORPHOLOGY: NORMAL
RBC: 4.2 M/UL (ref 4.7–6)
RDW: 14.5 % (ref 11.5–14)
WBC: 8.9 K/UL (ref 4–10.5)

## 2022-05-31 LAB
BUN SERPL-MCNC: 15 MG/DL (ref 7–18)
BUN/CREAT RATIO (CALC): 23.8 RATIO
CHLORIDE: 101 MMOL/L (ref 98–107)
CO2 (BICARBONATE): 29 MMOL/L (ref 21–32)
CREATININE: 0.63 MG/DL (ref 0.67–1.17)
GLUCOSE SERPL-MCNC: 85 MG/DL (ref 74–106)
POTASSIUM: 4.9 MMOL/L (ref 3.5–5.1)

## 2022-05-31 NOTE — NUR
5/31 Mr. Méndez has agreed to SNF placement. Referrals have been fqaxed to
[facility], [facility], and [facility] per his choice. [facility] does not have an available bed this week.

## 2022-06-01 NOTE — NUR
6/1/22 [facility] has accepted patient pending insurance
authorization. - A message has been left for Irinajune Moura to onquire if she
can provide transportation. - Recommendations were made for Mr. Méndez to see
his  re: devising a DPOA.

## 2022-06-01 NOTE — NUR
6/1/22 [facility] has received insurance auth for admission today.
Please call report to: [phone] and fax DS and signed H&P to:
[phone]. Pt meets criteria for EMS per nursing. - Irina Moura,
, was informed of discharge plan per Mr. Méndez's request.

## 2022-06-30 ENCOUNTER — HOSPITAL ENCOUNTER (EMERGENCY)
Dept: HOSPITAL 49 - FER | Age: 87
Discharge: HOME | End: 2022-06-30
Payer: COMMERCIAL

## 2022-06-30 DIAGNOSIS — I95.89: Primary | ICD-10-CM

## 2022-06-30 DIAGNOSIS — Z88.1: ICD-10-CM

## 2022-06-30 DIAGNOSIS — Z88.6: ICD-10-CM

## 2022-06-30 NOTE — PROGRESS NOTES
"Chief Complaint  Ear Fullness ('wax in my ear')    Subjective          Billy Larry presents to [facility] for nicholas ear fullness and hypotension with irregular heart rate was incidentally noted.  Patient does appear pale.  Patient's family member states that she believes he looks pale as well.  Patient states he took all of his blood pressure medications this morning and a pain pill prior to coming to the doctor.  His paperwork from rehab facility states that he was still taking amlodipine 10 mg.  At his last visit with his PCP, Dr. Herrera that was supposed to have been decreased to 5.  His lisinopril was decreased down to 20 as Dr. Herrera had wanted.  Does not have history of A. fib.  Patient refused EKG.       Objective   Vital Signs:   Vitals:    06/30/22 1326 06/30/22 1345   BP: (!) 61/34 (!) 58/35   BP Location: Right arm Left arm   Patient Position: Sitting Sitting   Cuff Size: Adult Adult   Pulse: (!) 40 (!) 161   Temp: 98.1 °F (36.7 °C)    TempSrc: Oral    Height: 173.4 cm (68.27\")        Physical Exam  Vitals reviewed.   Constitutional:       General: He is not in acute distress.     Appearance: Normal appearance. He is well-developed.   HENT:      Head: Normocephalic and atraumatic.      Ears:      Comments: Nicholas canals not totally impacted, but with significant wax build up  Eyes:      Conjunctiva/sclera: Conjunctivae normal.      Pupils: Pupils are equal, round, and reactive to light.   Cardiovascular:      Rate and Rhythm: Regular rhythm. Bradycardia present.      Heart sounds: Normal heart sounds. No murmur heard.  Pulmonary:      Effort: Pulmonary effort is normal. No respiratory distress.      Breath sounds: Normal breath sounds.   Skin:     General: Skin is warm and dry.   Neurological:      Mental Status: He is alert and oriented to person, place, and time.   Psychiatric:         Mood and Affect: Mood and affect normal.         Behavior: Behavior normal.         " Thought Content: Thought content normal.         Judgment: Judgment normal.          Result Review :              Assessment and Plan    Diagnoses and all orders for this visit:    1. Cerumen in auditory canal on examination (Primary)  Comments:  Ears irrigated.  Patient tolerated procedure well.    2. Hypotension, unspecified hypotension type  Comments:  Personally checked blood pressure manually and it was 48/28 and heart rate was 72.  Sent patient to ED.  Declined EMS.    3. Irregular heart rhythm  Comments:  Patient refused EKG.    Will send chart to PCP.  Patient to notify office with any acute concerns or issues.  Patient verbalizes understanding, agrees with plan of care and has no further questions upon discharge.  Patient is going to go to [facility] ED.  Report called to SUSAN Palumbo.    Please note that portions of this note were completed with a voice recognition program.    Follow Up    Return if symptoms worsen or fail to improve.  Patient was given instructions and counseling regarding his condition or for health maintenance advice. Please see specific information pulled into the AVS if appropriate.

## 2022-07-08 NOTE — TELEPHONE ENCOUNTER
Pt called stating that Antony told him there is an issue with his Lisinopril.  When speaking to pt, he states that he was in [facility] and they sent him home with a med list that said to stop the Lisinopril 20mg.  I have contacted [facility] in [facility] and Jane will fax me a med list.  [phone]

## 2022-07-12 NOTE — TELEPHONE ENCOUNTER
Pt concerned because the nursing home took him off of his Lisinopril when he was in there, he is asking if he should stay off of it.  He is seeing you on 7/19.  I had nursing home send last labs and bp readings from when he was there and put on your desk.  Please advise

## 2022-07-19 PROBLEM — F10.20 ALCOHOLISM (HCC): Status: ACTIVE | Noted: 2022-01-01

## 2022-07-19 PROBLEM — F10.10 ALCOHOL ABUSE: Status: ACTIVE | Noted: 2022-01-01

## 2022-07-19 NOTE — ASSESSMENT & PLAN NOTE
His hospital discharge summary states he had delirium tremens.  Evidently went about 6 weeks without drinking alcohol between hospitalization and rehab.  Unfortunately, it sounds like he has so started drinking again.

## 2022-07-19 NOTE — ASSESSMENT & PLAN NOTE
I have concerns about Mr. Larry's ability to live independently.  Especially if he is drinking alcohol again he is likely going to be at risk of having another fall and potentially injuring himself seriously.  With Jessa's help hopefully we can get him to appoint a healthcare surrogate as someone to help him with medical decisions and/or to make decisions on his behalf if he is unable.  I am going to ask home health to see him for nursing care, physical therapy, and social work.  Nursing can help with medication management and clarification.  Physical therapy to help with his gait disturbance and safety evaluation.   to assess patient's ability to live alone and whether APS needs to be involved.

## 2022-07-19 NOTE — PROGRESS NOTES
Chief Complaint  discharge from [facility] (6/2/22-6/29/22) and discuss meds    Subjective          Billy MUIR Jr. Larry presents to [facility]  History of Present Illness    Mr. Larry was discharged from [facility] May 31 following 7-day hospitalization for urinary tract infection, encephalopathy, delirium tremens, orthostatic hypotension, with frequent falls.    Following hospitalization he went to a rehab facility at [facility] in [place].  He handed me the discharge papers from the facility (scanned).  It has a medication list which he seems to want to dispute.     These people think they are going to jump in there and make your life perfect.     He is having some dysuria.     Jessa Martínez, his  and his main local support came to pick him up and he gave us permission to talk with each other.  I discussed the need to clarify his Living Will and Health Care Surrogate information.  She confirmed that they had discussed previously but he wasn't wanting to make a decision at that time.  Jessa herself questions his ability to live independently.  She also confirms that he drinks alcohol on regular basis.  He had given it up for a short while after his return home from [facility], but evidently has started drinking again.     Current Outpatient Medications   Medication Sig Dispense Refill   • acetaminophen (TYLENOL) 500 MG tablet Take 500 mg by mouth Every 6 (Six) Hours As Needed for Mild Pain .     • amLODIPine (NORVASC) 5 MG tablet Take 1 tablet by mouth Daily. (Patient taking differently: Take 10 mg by mouth Daily.) 90 tablet 0   • aspirin 81 MG tablet Take 81 mg by mouth Daily.     • atorvastatin (LIPITOR) 10 MG tablet TAKE ONE TABLET BY MOUTH ONCE NIGHTLY 30 tablet 2   • bimatoprost (Lumigan) 0.01 % ophthalmic drops Apply  to eye(s) as directed by provider.     • cyclobenzaprine (FLEXERIL) 10 MG tablet Take 10 mg by mouth 3 (Three) Times a Day As Needed for Muscle  "Spasms.     • DULoxetine (CYMBALTA) 30 MG capsule Take 1 capsule by mouth Daily. 30 capsule 2   • ferrous sulfate 324 (65 Fe) MG tablet delayed-release EC tablet Take 324 mg by mouth Daily With Breakfast.     • folic acid (FOLVITE) 1 MG tablet Take 1 mg by mouth Daily.     • Magnesium Oxide (Magnesium Oxide 400) 240 MG pack Take  by mouth 2 (Two) Times a Day.     • thiamine (VITAMIN B-1) 100 MG tablet  tablet Take 100 mg by mouth Daily.     • timolol (TIMOPTIC) 0.5 % ophthalmic solution        No current facility-administered medications for this visit.       Review of Systems         Objective   Vital Signs:   /55 (BP Location: Right arm, Patient Position: Sitting)   Pulse 58   Ht 173.4 cm (68.27\")   BMI 24.44 kg/m²     Physical Exam   He is in no acute distress  Frail appearing  Multiple senile ecchymosis on forearms  Eyes are nonicteric  Heart is regular rhythm 2/6 systolic murmur  Lungs bilaterally clear  Abdomen soft and nontender  No CVA tenderness  No lateralizing focal neurologic deficit  No asterixis  No evidence of hallucinations or delusions  Globular right knee with inability to fully extend (chronic)    Result Review :                     Assessment and Plan    Diagnoses and all orders for this visit:    1. Gait disturbance (Primary)  Assessment & Plan:  I have concerns about Mr. Larry's ability to live independently.  Especially if he is drinking alcohol again he is likely going to be at risk of having another fall and potentially injuring himself seriously.  With Jessa's help hopefully we can get him to appoint a healthcare surrogate as someone to help him with medical decisions and/or to make decisions on his behalf if he is unable.  I am going to ask home health to see him for nursing care, physical therapy, and social work.  Nursing can help with medication management and clarification.  Physical therapy to help with his gait disturbance and safety evaluation.   to assess " patient's ability to live alone and whether APS needs to be involved.    Orders:  -     Ambulatory Referral to Home Health    2. Fall, subsequent encounter  -     Ambulatory Referral to Home Health    3. Alcoholism (HCC)  Assessment & Plan:  His hospital discharge summary states he had delirium tremens.  Evidently went about 6 weeks without drinking alcohol between hospitalization and rehab.  Unfortunately, it sounds like he has so started drinking again.      4. Essential hypertension, benign  -     Ambulatory Referral to Home Health    5. Dysuria  Comments:  He states he is having some burning on urination again.  His hospitalization was initially precipitated by UTI so will ask home health to collect UA C&S + Labs  Orders:  -     Ambulatory Referral to Home Health    I spent 50 minutes caring for Billy on this date of service. This time includes time spent by me in the following activities:preparing for the visit, reviewing tests, obtaining and/or reviewing a separately obtained history, performing a medically appropriate examination and/or evaluation , counseling and educating the patient/family/caregiver, ordering medications, tests, or procedures, referring and communicating with other health care professionals , documenting information in the medical record and care coordination  Follow Up   No follow-ups on file.  Patient was given instructions and counseling regarding his condition or for health maintenance advice. Please see specific information pulled into the AVS if appropriate.

## 2022-07-19 NOTE — TELEPHONE ENCOUNTER
Ask HH to see him  He needs labs:  CBC, CMP, U/A C&S  He needs medication management assistance  He needs PT, and safety evaluation  He needs  eval regarding appropriateness of his living situation (can he truly live alone, or does APS need to be involved?)

## 2022-07-22 NOTE — TELEPHONE ENCOUNTER
Caller: Billy Larry Jr.    Relationship to patient: Self    Best call back number: [phone]    Patient is needing: PATIENT CALLED STATING HIS PCP PUT IN A MESSAGE FOR THE PATIENT FOR FINANCIAL AID SERVICES AND HAS NOT HEARD ANYTHING BACK FROM THEM. THE PATIENT ALSO STATED HE WOULD LIKE TO KNOW WHAT HE SHOULD DO ABOUT A UTI HE POSSIBLY MAY HAVE. THE PATIENT WOULD LIKE A CALL BACK TO SPEAK WITH THE NURSE REGARDING THESE SITUATIONS PLEASE ADVISE THANK YOU.

## 2022-07-26 NOTE — OUTREACH NOTE
AMBULATORY CASE MANAGEMENT NOTE    Name and Relationship of Patient/Support Person: Billy Larry Jr. - Self  Self    Unable to leave voice message regarding urine culture.     JOSE R  Ambulatory Case Management    7/26/2022, 15:51 EDT     AMBULATORY CASE MANAGEMENT NOTE    Name and Relationship of Patient/Support Person: Radha - Home Health    Spoke with CHERYL Garcia [facility]  She reports that they did do an intake and it is questionable whether he can live by himself.  She is going to let PT do an evaluation and APS may need to be involved.  Lab orders given and she is going back out on Thursday or Friday.     He does have a pressure ulcer on his right heel, from where he sits at his computer and they placed Mepillex and will observe.    Radha will call with any needs.       Education Documentation  No documentation found.        JOSE R  Ambulatory Case Management    7/26/2022, 15:52 EDT

## 2022-08-02 ENCOUNTER — HOSPITAL ENCOUNTER (EMERGENCY)
Dept: HOSPITAL 49 - FER | Age: 87
Discharge: HOME | End: 2022-08-02
Payer: COMMERCIAL

## 2022-08-02 DIAGNOSIS — F03.90: ICD-10-CM

## 2022-08-02 DIAGNOSIS — E11.9: ICD-10-CM

## 2022-08-02 DIAGNOSIS — Z20.822: ICD-10-CM

## 2022-08-02 DIAGNOSIS — I95.89: Primary | ICD-10-CM

## 2022-08-02 DIAGNOSIS — Z87.891: ICD-10-CM

## 2022-08-02 LAB
ALBUMIN SERPL-MCNC: 2.7 G/DL (ref 3.4–5)
ALKALINE PHOSHATASE: 96 U/L (ref 46–116)
ALT SERPL-CCNC: 28 U/L (ref 16–63)
AST: 42 U/L (ref 15–37)
BASOPHIL: 0.4 % (ref 0–2)
BILIRUBIN - TOTAL: 0.8 MG/DL (ref 0.2–1)
BILIRUBIN: NEGATIVE MG/DL
BLOOD: NEGATIVE ERY/UL
BUN SERPL-MCNC: 19 MG/DL (ref 7–18)
BUN/CREAT RATIO (CALC): 31.1 RATIO
C-REACTIVE PROTEIN: 2.3 MG/DL (ref ?–0.9)
CHLORIDE: 104 MMOL/L (ref 98–107)
CLARITY UR: (no result)
CO2 (BICARBONATE): 27 MMOL/L (ref 21–32)
COLOR: YELLOW
CORONAVIRUS 2019 SARS-COV-2: NEGATIVE
CREATININE: 0.61 MG/DL (ref 0.67–1.17)
EOSINOPHIL: 1.7 % (ref 0–7)
FT4 (FREE T4): 1.2 NG/DL (ref 0.76–1.46)
GLOBULIN (CALCULATION): 3.1 G/DL
GLUCOSE (U): NORMAL MG/DL
GLUCOSE SERPL-MCNC: 79 MG/DL (ref 74–106)
HCT: 39.8 % (ref 42–52)
HGB BLD-MCNC: 13.1 G/DL (ref 13.2–18)
INFLUENZA A NAA: NEGATIVE
INR PPP: 0.97 (ref 0.9–1.2)
IRON % SATURATION: 25.1 %SAT (ref 20–50)
IRON SERPL-MCNC: 55 UG/DL (ref 65–175)
LACTIC ACID: 1.2 MMOL/L (ref 0.4–1.9)
LEUKOCYTES: NEGATIVE LEU/UL
LIPASE: 90 U/L (ref 73–393)
LYMPHOCYTE: 12.3 % (ref 15–48)
MAGNESIUM SERPL-MCNC: 1.8 MG/DL (ref 1.8–2.4)
MCH RBC QN AUTO: 31.3 PG (ref 25–31)
MCHC RBC AUTO-ENTMCNC: 32.9 G/DL (ref 32–36)
MCV: 95 FL (ref 78–100)
MONOCYTE: 7.2 % (ref 0–12)
MPV: 10.1 FL (ref 6–9.5)
NEUTROPHIL: 78.2 % (ref 41–80)
NITRITE: NEGATIVE MG/DL
NRBC: 0
PLT: 232 K/UL (ref 150–400)
POTASSIUM: 3.8 MMOL/L (ref 3.5–5.1)
PROTEIN: NEGATIVE MG/DL
PROTHROMBIN TIME: 12.6 SECONDS (ref 11.9–13.9)
PTT: 32.6 SECONDS (ref 24.9–34.6)
RBC MORPHOLOGY: NORMAL
RBC: 4.19 M/UL (ref 4.7–6)
RDW: 14.5 % (ref 11.5–14)
SPECIFIC GRAVITY: > 1.03 (ref 1–1.03)
TOTAL PROTEIN: 5.8 G/DL (ref 6.4–8.2)
UROBILINOGEN: 0.2 MG/DL (ref 0.2–1)
WBC: 8.1 K/UL (ref 4–10.5)

## 2022-08-02 PROCEDURE — G0480 DRUG TEST DEF 1-7 CLASSES: HCPCS

## 2022-08-02 PROCEDURE — U0002 COVID-19 LAB TEST NON-CDC: HCPCS

## 2022-08-03 ENCOUNTER — HOSPITAL ENCOUNTER (INPATIENT)
Dept: HOSPITAL 49 - FER | Age: 87
LOS: 8 days | DRG: 871 | End: 2022-08-11
Attending: INTERNAL MEDICINE | Admitting: INTERNAL MEDICINE
Payer: COMMERCIAL

## 2022-08-03 VITALS — HEIGHT: 69.02 IN | BODY MASS INDEX: 22.24 KG/M2 | WEIGHT: 150.19 LBS

## 2022-08-03 DIAGNOSIS — S40.022A: ICD-10-CM

## 2022-08-03 DIAGNOSIS — E78.5: ICD-10-CM

## 2022-08-03 DIAGNOSIS — I73.9: ICD-10-CM

## 2022-08-03 DIAGNOSIS — I71.4: ICD-10-CM
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
## 2022-08-03 DIAGNOSIS — F10.131: ICD-10-CM

## 2022-08-03 DIAGNOSIS — I10: ICD-10-CM

## 2022-08-03 DIAGNOSIS — E83.42: ICD-10-CM

## 2022-08-03 DIAGNOSIS — A41.9: Primary | ICD-10-CM

## 2022-08-03 DIAGNOSIS — Z20.822: ICD-10-CM

## 2022-08-03 DIAGNOSIS — B37.2: ICD-10-CM

## 2022-08-03 DIAGNOSIS — I25.10: ICD-10-CM

## 2022-08-03 DIAGNOSIS — E16.2: ICD-10-CM

## 2022-08-03 DIAGNOSIS — Z98.890: ICD-10-CM

## 2022-08-03 DIAGNOSIS — S41.111A: ICD-10-CM

## 2022-08-03 DIAGNOSIS — R74.01: ICD-10-CM

## 2022-08-03 DIAGNOSIS — D50.9: ICD-10-CM

## 2022-08-03 DIAGNOSIS — Z88.1: ICD-10-CM

## 2022-08-03 DIAGNOSIS — Z88.8: ICD-10-CM

## 2022-08-03 DIAGNOSIS — J69.0: ICD-10-CM

## 2022-08-03 DIAGNOSIS — Z96.641: ICD-10-CM

## 2022-08-03 DIAGNOSIS — G47.30: ICD-10-CM

## 2022-08-03 DIAGNOSIS — F03.90: ICD-10-CM

## 2022-08-03 DIAGNOSIS — M47.816: ICD-10-CM

## 2022-08-03 DIAGNOSIS — N48.1: ICD-10-CM

## 2022-08-03 DIAGNOSIS — M47.812: ICD-10-CM

## 2022-08-03 DIAGNOSIS — R29.6: ICD-10-CM

## 2022-08-03 DIAGNOSIS — R73.9: ICD-10-CM

## 2022-08-03 DIAGNOSIS — R65.21: ICD-10-CM

## 2022-08-03 DIAGNOSIS — E44.0: ICD-10-CM

## 2022-08-03 DIAGNOSIS — J18.9: ICD-10-CM

## 2022-08-03 DIAGNOSIS — L89.610: ICD-10-CM

## 2022-08-03 DIAGNOSIS — G93.41: ICD-10-CM

## 2022-08-03 DIAGNOSIS — Z96.651: ICD-10-CM

## 2022-08-03 DIAGNOSIS — B35.6: ICD-10-CM

## 2022-08-03 DIAGNOSIS — J96.01: ICD-10-CM

## 2022-08-03 DIAGNOSIS — N30.01: ICD-10-CM

## 2022-08-03 LAB
ALBUMIN SERPL-MCNC: 2.7 G/DL (ref 3.4–5)
ALKALINE PHOSHATASE: 100 U/L (ref 46–116)
ALT SERPL-CCNC: 33 U/L (ref 16–63)
AST: 40 U/L (ref 15–37)
BACTERIA: (no result)
BASOPHIL: 0.3 % (ref 0–2)
BILIRUBIN - TOTAL: 0.7 MG/DL (ref 0.2–1)
BILIRUBIN: NEGATIVE MG/DL
BLOOD: (no result) ERY/UL
BUN SERPL-MCNC: 19 MG/DL (ref 7–18)
BUN/CREAT RATIO (CALC): 31.1 RATIO
CAOX CRY #/AREA URNS HPF: (no result) /[HPF]
CHLORIDE: 101 MMOL/L (ref 98–107)
CK SERPL-CCNC: 75 U/L (ref 39–308)
CLARITY UR: CLEAR
CO2 (BICARBONATE): 28 MMOL/L (ref 21–32)
COLOR: YELLOW
CORONAVIRUS 2019 SARS-COV-2: NEGATIVE
CREATININE: 0.61 MG/DL (ref 0.67–1.17)
EOSINOPHIL: 0.8 % (ref 0–7)
FT4 (FREE T4): 1.2 NG/DL (ref 0.76–1.46)
GLOBULIN (CALCULATION): 3.2 G/DL
GLUCOSE (U): NORMAL MG/DL
GLUCOSE SERPL-MCNC: 129 MG/DL (ref 74–106)
HCT: 38.7 % (ref 42–52)
HGB BLD-MCNC: 12.9 G/DL (ref 13.2–18)
INFLUENZA A NAA: NEGATIVE
INR PPP: 0.97 (ref 0.9–1.2)
LACTIC ACID: 0.7 MMOL/L (ref 0.4–1.9)
LEUKOCYTES: NEGATIVE LEU/UL
LYMPHOCYTE: 9.1 % (ref 15–48)
MAGNESIUM SERPL-MCNC: 1.7 MG/DL (ref 1.8–2.4)
MCH RBC QN AUTO: 31.7 PG (ref 25–31)
MCHC RBC AUTO-ENTMCNC: 33.3 G/DL (ref 32–36)
MCV: 95.1 FL (ref 78–100)
MONOCYTE: 4.3 % (ref 0–12)
MPV: 9.8 FL (ref 6–9.5)
NEUTROPHIL: 84.1 % (ref 41–80)
NITRITE: NEGATIVE MG/DL
NRBC: 0
PLT: 171 K/UL (ref 150–400)
POTASSIUM: 4.3 MMOL/L (ref 3.5–5.1)
PROTEIN: NEGATIVE MG/DL
PROTHROMBIN TIME: 12.6 SECONDS (ref 11.9–13.9)
PTT: 31.7 SECONDS (ref 24.9–34.6)
RBC MORPHOLOGY: NORMAL
RBC: 4.07 M/UL (ref 4.7–6)
RDW: 14.2 % (ref 11.5–14)
SPECIFIC GRAVITY: >=1.03 (ref 1–1.03)
SQUAMOUS EPITHELIAL CELLS: (no result)
TOTAL PROTEIN: 5.9 G/DL (ref 6.4–8.2)
URINARY RBC: (no result)
URINARY WBC: (no result)
UROBILINOGEN: 1 MG/DL (ref 0.2–1)
WBC: 9.9 K/UL (ref 4–10.5)

## 2022-08-03 PROCEDURE — 0T9B70Z DRAINAGE OF BLADDER WITH DRAINAGE DEVICE, VIA NATURAL OR ARTIFICIAL OPENING: ICD-10-PCS | Performed by: FAMILY MEDICINE

## 2022-08-03 PROCEDURE — U0002 COVID-19 LAB TEST NON-CDC: HCPCS

## 2022-08-03 PROCEDURE — HZ2ZZZZ DETOXIFICATION SERVICES FOR SUBSTANCE ABUSE TREATMENT: ICD-10-PCS | Performed by: FAMILY MEDICINE

## 2022-08-03 PROCEDURE — 3E03329 INTRODUCTION OF OTHER ANTI-INFECTIVE INTO PERIPHERAL VEIN, PERCUTANEOUS APPROACH: ICD-10-PCS | Performed by: FAMILY MEDICINE

## 2022-08-04 LAB
BUN SERPL-MCNC: 13 MG/DL (ref 7–18)
BUN/CREAT RATIO (CALC): 21 RATIO
CHLORIDE: 102 MMOL/L (ref 98–107)
CO2 (BICARBONATE): 30 MMOL/L (ref 21–32)
CREATININE: 0.62 MG/DL (ref 0.67–1.17)
GLUCOSE SERPL-MCNC: 83 MG/DL (ref 74–106)
HCT: 36.6 % (ref 42–52)
HGB BLD-MCNC: 12.3 G/DL (ref 13.2–18)
MCH RBC QN AUTO: 31.6 PG (ref 25–31)
MCHC RBC AUTO-ENTMCNC: 33.6 G/DL (ref 32–36)
MCV: 94.1 FL (ref 78–100)
MPV: 9.9 FL (ref 6–9.5)
PLT: 171 K/UL (ref 150–400)
POTASSIUM: 3.6 MMOL/L (ref 3.5–5.1)
RBC: 3.89 M/UL (ref 4.7–6)
RDW: 14.5 % (ref 11.5–14)
WBC: 11 K/UL (ref 4–10.5)

## 2022-08-04 NOTE — NUR
8/4/22 Mr. Méndez lives alone. He has a , Irina Moura, [phone].
Mr. Méndez has a rw and s. seat. He has been having falls at home. [facility] has
discharge him from their services stating patient is not safe in the home.
APS, Willow Moy is has an open case, [phone]. Ms. Moy plans to
request guardianship. - Mr. Méndez has agreed to SNF placement. - His first
choice is [facility] as his 2nd choice. [facility] does not accept
his insurance. All [facility] facilities have denied because Mr. Méndez would not
apply for Medicaid when he was last in their facility in [facility] and he owes
the facility money. [facility] does not have a bed available. [facility], [facility] are reviewing.

## 2022-08-04 NOTE — TELEPHONE ENCOUNTER
Caller: LLOYD    Relationship: Other    Best call back number: [phone]    What is the best time to reach you: ANYTIME    What was the call regarding: GOLDEN WITH [facility] STATES THAT THIS PATIENT IS CURRENTLY ADMITTED. SHE STATES THAT PATIENT WAS ADMITTED ON 7/29 AND 8/3 FOR A FALL AT HOME.     Do you require a callback: YES

## 2022-08-04 NOTE — OUTREACH NOTE
AMBULATORY CASE MANAGEMENT NOTE    Name and Relationship of Patient/Support Person:  -     Call from home health, patient has fallen again, EMS called, admitted to [facility] again.  ER visit/admission on 7/29, 8/2, 8/3 all due to falling.  Home health has reported to APS.  Will also make a report.  He is adamantly refusing skilled nursing care or placement.  He has had 6 ER visits in 2022.    Reported that his caregiver had quit due to the amount of falls he has had.   Home health reports that he is unwilling to cooperate in PT and nursing is not helping, he obsesses with his notebooks and his readings and refuses to listen or take any guidance from nurse.   Home health reports that most likely they will not take him back due to his high risk.        JOSE MENG  Ambulatory Case Management    8/4/2022, 09:15 EDT

## 2022-08-05 NOTE — NUR
8/5/22 [facility] denied and will not consider accepting until a guardian is
secured. [facility] denied becuase they are not in network. [facility] and
[facility] is reviewing the referral.

## 2022-08-07 LAB
ALBUMIN SERPL-MCNC: 2.3 G/DL (ref 3.4–5)
ALKALINE PHOSHATASE: 100 U/L (ref 46–116)
ALT SERPL-CCNC: 43 U/L (ref 16–63)
AST: 69 U/L (ref 15–37)
BILIRUBIN - TOTAL: 0.7 MG/DL (ref 0.2–1)
BUN SERPL-MCNC: 11 MG/DL (ref 7–18)
BUN/CREAT RATIO (CALC): 20.8 RATIO
CHLORIDE: 100 MMOL/L (ref 98–107)
CO2 (BICARBONATE): 31 MMOL/L (ref 21–32)
CREATININE: 0.53 MG/DL (ref 0.67–1.17)
GLOBULIN (CALCULATION): 3.3 G/DL
GLUCOSE SERPL-MCNC: 88 MG/DL (ref 74–106)
HCT: 37.9 % (ref 42–52)
HGB BLD-MCNC: 12.6 G/DL (ref 13.2–18)
MCH RBC QN AUTO: 31.8 PG (ref 25–31)
MCHC RBC AUTO-ENTMCNC: 33.2 G/DL (ref 32–36)
MCV: 95.7 FL (ref 78–100)
MPV: 10.2 FL (ref 6–9.5)
PLT: 167 K/UL (ref 150–400)
POTASSIUM: 3.9 MMOL/L (ref 3.5–5.1)
RBC: 3.96 M/UL (ref 4.7–6)
RDW: 14.9 % (ref 11.5–14)
TOTAL PROTEIN: 5.6 G/DL (ref 6.4–8.2)
WBC: 10.5 K/UL (ref 4–10.5)

## 2022-08-08 LAB
ALBUMIN SERPL-MCNC: 2.2 G/DL (ref 3.4–5)
ALKALINE PHOSHATASE: 93 U/L (ref 46–116)
ALT SERPL-CCNC: 40 U/L (ref 16–63)
AST: 64 U/L (ref 15–37)
BILIRUBIN - TOTAL: 0.6 MG/DL (ref 0.2–1)
BUN SERPL-MCNC: 11 MG/DL (ref 7–18)
BUN/CREAT RATIO (CALC): 16.7 RATIO
CHLORIDE: 102 MMOL/L (ref 98–107)
CO2 (BICARBONATE): 29 MMOL/L (ref 21–32)
CREATININE: 0.66 MG/DL (ref 0.67–1.17)
GLOBULIN (CALCULATION): 3.4 G/DL
GLUCOSE SERPL-MCNC: 43 MG/DL (ref 74–106)
HCT: 35.7 % (ref 42–52)
HGB BLD-MCNC: 11.9 G/DL (ref 13.2–18)
MCH RBC QN AUTO: 31.8 PG (ref 25–31)
MCHC RBC AUTO-ENTMCNC: 33.3 G/DL (ref 32–36)
MCV: 95.5 FL (ref 78–100)
MPV: 10.5 FL (ref 6–9.5)
PLT: 181 K/UL (ref 150–400)
POTASSIUM: 3.8 MMOL/L (ref 3.5–5.1)
RBC: 3.74 M/UL (ref 4.7–6)
RDW: 14.8 % (ref 11.5–14)
TOTAL PROTEIN: 5.6 G/DL (ref 6.4–8.2)
WBC: 7.3 K/UL (ref 4–10.5)

## 2022-08-08 NOTE — NUR
8/8/22 Mr. Méndez started exhibiting withdrawal symptoms, hallucinations, on
8/7/22. He is not ready for discharge.

## 2022-08-09 LAB
ALBUMIN SERPL-MCNC: 2.1 G/DL (ref 3.4–5)
ALKALINE PHOSHATASE: 86 U/L (ref 46–116)
ALT SERPL-CCNC: 35 U/L (ref 16–63)
AST: 36 U/L (ref 15–37)
BASOPHIL: 0.3 % (ref 0–2)
BILIRUBIN - TOTAL: 0.6 MG/DL (ref 0.2–1)
BUN SERPL-MCNC: 12 MG/DL (ref 7–18)
BUN/CREAT RATIO (CALC): 18.2 RATIO
C-REACTIVE PROTEIN: 6 MG/DL (ref ?–0.9)
CHLORIDE: 101 MMOL/L (ref 98–107)
CO2 (BICARBONATE): 29 MMOL/L (ref 21–32)
CREATININE: 0.66 MG/DL (ref 0.67–1.17)
EOSINOPHIL: 1.3 % (ref 0–7)
GLOBULIN (CALCULATION): 2.8 G/DL
GLUCOSE SERPL-MCNC: 95 MG/DL (ref 74–106)
HCT: 33.6 % (ref 42–52)
HGB BLD-MCNC: 11 G/DL (ref 13.2–18)
IRON % SATURATION: 12.7 %SAT (ref 20–50)
IRON SERPL-MCNC: 22 UG/DL (ref 65–175)
LYMPHOCYTE: 7.7 % (ref 15–48)
MAGNESIUM SERPL-MCNC: 1.7 MG/DL (ref 1.8–2.4)
MCH RBC QN AUTO: 31.7 PG (ref 25–31)
MCHC RBC AUTO-ENTMCNC: 32.7 G/DL (ref 32–36)
MCV: 96.8 FL (ref 78–100)
MONOCYTE: 9 % (ref 0–12)
MPV: 10 FL (ref 6–9.5)
NEUTROPHIL: 81.3 % (ref 41–80)
NRBC: 0
PHOSPHORUS: 2.9 MG/DL (ref 2.6–4.7)
PLT: 167 K/UL (ref 150–400)
POTASSIUM: 3.5 MMOL/L (ref 3.5–5.1)
RBC MORPHOLOGY: NORMAL
RBC: 3.47 M/UL (ref 4.7–6)
RDW: 15 % (ref 11.5–14)
TOTAL PROTEIN: 4.9 G/DL (ref 6.4–8.2)
VITAMIN B12: 1042 PG/ML (ref 193–986)
WBC: 12 K/UL (ref 4–10.5)

## 2022-08-09 NOTE — NUR
8/9/22 Mr. Méndez continues to withdrawal from [facility]. New therapy assessments
will likely be required in order to persue SNF placement if patient continues
to be in agreement with the plan.

## 2022-08-10 LAB
ALBUMIN SERPL-MCNC: 1.7 G/DL (ref 3.4–5)
ALKALINE PHOSHATASE: 80 U/L (ref 46–116)
ALT SERPL-CCNC: 26 U/L (ref 16–63)
AST: 22 U/L (ref 15–37)
BASOPHIL: 0.3 % (ref 0–2)
BILIRUBIN - TOTAL: 0.6 MG/DL (ref 0.2–1)
BUN SERPL-MCNC: 12 MG/DL (ref 7–18)
BUN/CREAT RATIO (CALC): 26.1 RATIO
C-REACTIVE PROTEIN: 11.2 MG/DL (ref ?–0.9)
CHLORIDE: 105 MMOL/L (ref 98–107)
CO2 (BICARBONATE): 29 MMOL/L (ref 21–32)
CREATININE: 0.46 MG/DL (ref 0.67–1.17)
EOSINOPHIL: 0.3 % (ref 0–7)
GLOBULIN (CALCULATION): 2.1 G/DL
GLUCOSE SERPL-MCNC: 78 MG/DL (ref 74–106)
HCT: 32 % (ref 42–52)
HGB BLD-MCNC: 10.3 G/DL (ref 13.2–18)
LYMPHOCYTE: 3.7 % (ref 15–48)
MAGNESIUM SERPL-MCNC: 2.1 MG/DL (ref 1.8–2.4)
MCH RBC QN AUTO: 31.5 PG (ref 25–31)
MCHC RBC AUTO-ENTMCNC: 32.2 G/DL (ref 32–36)
MCV: 97.9 FL (ref 78–100)
MONOCYTE: 3.9 % (ref 0–12)
MPV: 10.4 FL (ref 6–9.5)
NEUTROPHIL: 91.1 % (ref 41–80)
NRBC: 0
PLT: 151 K/UL (ref 150–400)
POTASSIUM: 4 MMOL/L (ref 3.5–5.1)
RBC MORPHOLOGY: NORMAL
RBC: 3.27 M/UL (ref 4.7–6)
RDW: 14.6 % (ref 11.5–14)
TOTAL PROTEIN: 3.8 G/DL (ref 6.4–8.2)
WBC: 19 K/UL (ref 4–10.5)

## 2022-08-10 PROCEDURE — 3E033XZ INTRODUCTION OF VASOPRESSOR INTO PERIPHERAL VEIN, PERCUTANEOUS APPROACH: ICD-10-PCS | Performed by: FAMILY MEDICINE

## 2022-08-10 NOTE — NUR
MONITORED PATIENT MENTAL STATUS THROUGHOUT THE DAY AND PATIENT WAS ONLY ALERT
TO NAME AND REQUIRED STERNAL RUB TO WAKE UP. WOULD ONLY OPEN EYES FOR A COUPLE
OF SECONDS AND WOULD FALL BACK ASLEEP. WOULD NOT FOLLOW COMMANDS. 1100
NOTIFIED  REGARDING PRESSURES AND MAP AT 62. 1LNS BOLUS WAS GIVEN.
PRESSURES IMPROVED WITH MAP OF 65. ZOSYN WAS STARTED AS WELL.
APPROX 1315 NOTIFIED  REGARDING PATIENT DECLINE. BREATHING WAS MORE
APENIC, SHALLOW, O2 SAT WAS DECREASING WITH VENTURI 50% SAT AT 90%.
BLOOD PRESSURE DROPPED TO 59. MANUAL WAS TAKEN TO CONFIRM.
SKIN COLOR WAS PALE AND PATIENT WAS LESS RESPONSIVE EVEN TO STERNAL RUB.
CONDITION WAS WORSENING .  CAME TO ASSESS AND MORE IV WERE PLACED.
PATIENT WAS MOVED TO ICU2.
BP MAP WAS 55 AND LEVOPHED WAS STARTED AS WELL.
 ORDERED MRI HEAD AND CHEST TO CHECK FOR BLOOD CLOT.
CLOTS WERE RULED OUT. ROMAZICON WAS GIVEN AND PATIENT RESPONDED WITHIN 10
SECONDS. BECAME MORE ORIENTED, ALERT AND WOULD FOLLOW COMMANDS.
ORDERS WERE PLACED TO BE GIVE Q20HR PRN.
LEVOPHED HAD TO BE RESTARTED 1626 DUE TO MAP DROPPING TO 55- 81/43 63
PATIENT WILL HAVE INCONSISTENT SLEEP APNEA AND KAMARI DOWN TO 40'S AT TIMES
VITALS CLOSELY MONITORED AT THIS TIME.

## 2022-08-10 NOTE — OUTREACH NOTE
AMBULATORY CASE MANAGEMENT NOTE    Name and Relationship of Patient/Support Person: Billy Larry Jr. - Self    Requested discharge summary for patient.  Attempted to call social work at [facility], not available.  Also reached out to patient, not available.   I suspect that he is long term care, last time I checked there was not a bed available locally.  Will continue to follow.     JOSE MENG  Ambulatory Case Management    8/10/2022, 15:52 EDT

## 2022-08-10 NOTE — NUR
PATIENTS LUNGS SOUNDED WET WITH CRACKLES AFTER THE BOLUS GIVEN FOR HIS BLOOD
PRESSURE. RT WAS CALLED AND ORDERED WAS PLACED FOR NT SUCTION. WHITISH CHUNKY
SUBSTANCE WAS IN THE SUCTION TUBING. APPEARS THE PT. MAY HAVE ASPIRATED
ACCORDING TO RT. APRN NOTIFIED. LASIX 20MG IV ALSO ORDERED AT THIS TIME. ER,RN

## 2022-08-11 LAB
ALBUMIN SERPL-MCNC: 2.1 G/DL (ref 3.4–5)
ALKALINE PHOSHATASE: 118 U/L (ref 46–116)
ALT SERPL-CCNC: 30 U/L (ref 16–63)
AST: 29 U/L (ref 15–37)
BASOPHIL: 0.3 % (ref 0–2)
BILIRUBIN - TOTAL: 0.5 MG/DL (ref 0.2–1)
BUN SERPL-MCNC: 14 MG/DL (ref 7–18)
BUN/CREAT RATIO (CALC): 28 RATIO
C-REACTIVE PROTEIN: >18 MG/DL (ref ?–0.9)
CHLORIDE: 107 MMOL/L (ref 98–107)
CO2 (BICARBONATE): 28 MMOL/L (ref 21–32)
CREATININE: 0.5 MG/DL (ref 0.67–1.17)
EOSINOPHIL: 0.1 % (ref 0–7)
GLOBULIN (CALCULATION): 2.9 G/DL
GLUCOSE SERPL-MCNC: 138 MG/DL (ref 74–106)
HCT: 43.1 % (ref 42–52)
HGB BLD-MCNC: 13.2 G/DL (ref 13.2–18)
LYMPHOCYTE: 1.1 % (ref 15–48)
MAGNESIUM SERPL-MCNC: 2.7 MG/DL (ref 1.8–2.4)
MCH RBC QN AUTO: 31.1 PG (ref 25–31)
MCHC RBC AUTO-ENTMCNC: 30.6 G/DL (ref 32–36)
MCV: 101.7 FL (ref 78–100)
MONOCYTE: 1.5 % (ref 0–12)
MPV: 10.6 FL (ref 6–9.5)
NEUTROPHIL: 96.2 % (ref 41–80)
NRBC: 0
PHOSPHORUS: 4.9 MG/DL (ref 2.6–4.7)
PLT: 181 K/UL (ref 150–400)
POTASSIUM: 5.3 MMOL/L (ref 3.5–5.1)
RBC MORPHOLOGY: NORMAL
RBC: 4.24 M/UL (ref 4.7–6)
RDW: 14.6 % (ref 11.5–14)
TOTAL PROTEIN: 5 G/DL (ref 6.4–8.2)
WBC: 27.8 K/UL (ref 4–10.5)

## 2022-08-11 NOTE — NUR
PT TIME OF DEATH ,FAMILY WAS NOTIFED BT DR BEE.  [facility]  [facility]
WAS SELECTED BY RONAK QUAN NEXT OF KIN, PATIENT WAS CLEAN AND PREPARED FOR
TRANSPORT. TALA R/O BODY FOR DONATION BECAUSE OF AGE. BELONGINGS WILL BE SENT
WITH HIM TO [facility] FOR FAMILY TO .
RONAK QUAN NOTIFIED ABOUT BELONGINGS

## 2022-08-11 NOTE — NUR
notified Maryana that his glasses, hearing aide and wallet was in a cloth bag
and was sent to [facility] home with  [facility].

## 2022-09-20 NOTE — TELEPHONE ENCOUNTER
Called to check up on patient after ER/observation visit.  Unable to leave a message.    INR at goal. Medications and chart reviewed. No changes noted to necessitate adjustment of warfarin or follow-up plan. See calendar.